# Patient Record
Sex: MALE | Race: WHITE | NOT HISPANIC OR LATINO
[De-identification: names, ages, dates, MRNs, and addresses within clinical notes are randomized per-mention and may not be internally consistent; named-entity substitution may affect disease eponyms.]

---

## 2019-12-04 ENCOUNTER — APPOINTMENT (OUTPATIENT)
Dept: DERMATOLOGY | Facility: CLINIC | Age: 64
End: 2019-12-04
Payer: COMMERCIAL

## 2019-12-04 DIAGNOSIS — L82.1 OTHER SEBORRHEIC KERATOSIS: ICD-10-CM

## 2019-12-04 DIAGNOSIS — L57.0 ACTINIC KERATOSIS: ICD-10-CM

## 2019-12-04 DIAGNOSIS — L81.4 OTHER MELANIN HYPERPIGMENTATION: ICD-10-CM

## 2019-12-04 PROBLEM — Z00.00 ENCOUNTER FOR PREVENTIVE HEALTH EXAMINATION: Status: ACTIVE | Noted: 2019-12-04

## 2019-12-04 PROCEDURE — 99203 OFFICE O/P NEW LOW 30 MIN: CPT | Mod: 25

## 2019-12-04 PROCEDURE — 17000 DESTRUCT PREMALG LESION: CPT

## 2019-12-04 RX ORDER — LEVOTHYROXINE SODIUM 0.2 MG/1
200 TABLET ORAL
Qty: 90 | Refills: 0 | Status: ACTIVE | COMMUNITY
Start: 2019-11-26

## 2019-12-04 RX ORDER — FLUOROURACIL 50 MG/G
5 CREAM TOPICAL
Qty: 1 | Refills: 1 | Status: ACTIVE | COMMUNITY
Start: 2019-12-04 | End: 1900-01-01

## 2019-12-04 NOTE — HISTORY OF PRESENT ILLNESS
[de-identified] : Pt. presents for skin check;\par No itching, bleeding, growing, changing lesions noted;\par Severity:  mild  \par Modifying factors:  none\par Associated symptoms:  none\par Context:  no association with activity \par One persistent lesion on right arm, for few mos; \par

## 2019-12-04 NOTE — PHYSICAL EXAM
[Full Body Skin Exam Performed] : performed [FreeTextEntry3] : Skin examination performed of the face, neck, trunk, arms, legs; \par The patient is well, alert and oriented, pleasant and cooperative.\par Eyelids, conjunctivae, oral mucosa, digits and nails all normal.  \par No cervical adenopathy.\par \par Normal findings include:\par \par Seborrheic keratoses\par Angiomas\par Lentigines\par scaling erythematous papule; hyperkeratotic;  R forearm\par scaling erythematous papules; across lower lip\par \par No lesions were suspicious for malignancy. \par \par

## 2019-12-04 NOTE — ASSESSMENT
[FreeTextEntry1] : Complete skin examination is negative for malignancy;\par The patient has a history of significant sun exposure.  Continue annual exams. \par \par LN2 to AK R arm\par 5FU to actinic cheilitis;  lower lip;  Risks and benefits of topical actinic keratosis treatments were discussed including redness, scaling, discomfort crusting, oozing, and recurrence.

## 2024-04-08 ENCOUNTER — EMERGENCY (EMERGENCY)
Facility: HOSPITAL | Age: 69
LOS: 0 days | Discharge: HOME | End: 2024-04-08
Attending: EMERGENCY MEDICINE
Payer: MEDICARE

## 2024-04-08 VITALS
HEART RATE: 74 BPM | OXYGEN SATURATION: 100 % | TEMPERATURE: 98 F | RESPIRATION RATE: 18 BRPM | DIASTOLIC BLOOD PRESSURE: 82 MMHG | SYSTOLIC BLOOD PRESSURE: 140 MMHG

## 2024-04-08 VITALS
SYSTOLIC BLOOD PRESSURE: 149 MMHG | WEIGHT: 214.95 LBS | HEART RATE: 77 BPM | TEMPERATURE: 97 F | RESPIRATION RATE: 18 BRPM | DIASTOLIC BLOOD PRESSURE: 93 MMHG | HEIGHT: 72 IN | OXYGEN SATURATION: 100 %

## 2024-04-08 DIAGNOSIS — M54.9 DORSALGIA, UNSPECIFIED: ICD-10-CM

## 2024-04-08 DIAGNOSIS — M54.16 RADICULOPATHY, LUMBAR REGION: ICD-10-CM

## 2024-04-08 DIAGNOSIS — E03.9 HYPOTHYROIDISM, UNSPECIFIED: ICD-10-CM

## 2024-04-08 PROCEDURE — 99284 EMERGENCY DEPT VISIT MOD MDM: CPT | Mod: 25

## 2024-04-08 PROCEDURE — 99284 EMERGENCY DEPT VISIT MOD MDM: CPT | Mod: FS

## 2024-04-08 PROCEDURE — 96375 TX/PRO/DX INJ NEW DRUG ADDON: CPT

## 2024-04-08 PROCEDURE — 96374 THER/PROPH/DIAG INJ IV PUSH: CPT

## 2024-04-08 RX ORDER — OXYCODONE HYDROCHLORIDE 5 MG/1
1 TABLET ORAL
Qty: 12 | Refills: 0
Start: 2024-04-08 | End: 2024-04-10

## 2024-04-08 RX ORDER — ACETAMINOPHEN 500 MG
1000 TABLET ORAL ONCE
Refills: 0 | Status: COMPLETED | OUTPATIENT
Start: 2024-04-08 | End: 2024-04-08

## 2024-04-08 RX ORDER — MORPHINE SULFATE 50 MG/1
4 CAPSULE, EXTENDED RELEASE ORAL ONCE
Refills: 0 | Status: DISCONTINUED | OUTPATIENT
Start: 2024-04-08 | End: 2024-04-08

## 2024-04-08 RX ORDER — KETOROLAC TROMETHAMINE 30 MG/ML
30 SYRINGE (ML) INJECTION ONCE
Refills: 0 | Status: DISCONTINUED | OUTPATIENT
Start: 2024-04-08 | End: 2024-04-08

## 2024-04-08 RX ADMIN — MORPHINE SULFATE 4 MILLIGRAM(S): 50 CAPSULE, EXTENDED RELEASE ORAL at 20:29

## 2024-04-08 RX ADMIN — Medication 30 MILLIGRAM(S): at 20:29

## 2024-04-08 RX ADMIN — Medication 400 MILLIGRAM(S): at 20:29

## 2024-04-08 NOTE — ED STATDOCS - CLINICAL SUMMARY MEDICAL DECISION MAKING FREE TEXT BOX
No red flags for back pain, including no fever, no hx of IVDU, no hx of CA, no trauma, no recent surgery to spine, no urinary retention, no bowel or bladder incontinence, no saddle anesthesia, no sensory or motor neurologic deficit.  Thus no concern for epidural abscess, epidural hematoma, cauda equina, vertebral fracture, or any other emergent cause for back pain.  Will give trial pain meds, reevaluate patient.

## 2024-04-08 NOTE — ED STATDOCS - OBJECTIVE STATEMENT
68-year-old male past medical history of essential tremor, BPH, autoimmune thyroiditis, hypothyroidism, who presented with chief complaint of back pain.  Patient stated he started having back pain a few days ago, with sciatica down the left leg.  States he has had back issues before.  Denies any weakness, incontinence, fever, or direct trauma to the back.  States he was evaluated by neurosurgery Dr. Edwards, and states he had an MRI today results still pending.  Patient was given steroid Dosepak, Flexeril, and has been taking Motrin as an outpatient without improvement symptoms.  Patient states that pain feels worse tonight, difficulty walking secondary to pain, can to ED for further evaluation.

## 2024-04-08 NOTE — ED STATDOCS - PATIENT PORTAL LINK FT
You can access the FollowMyHealth Patient Portal offered by [facility] by registering at the following website: [email or web address]. By joining [facility]’s FollowMyHealth portal, you will also be able to view your health information using other applications (apps) compatible with our system.

## 2024-04-08 NOTE — ED STATDOCS - CARE PLAN
Principal Discharge DX:	Back pain   1 Principal Discharge DX:	Left lumbar radiculopathy  Secondary Diagnosis:	Back pain

## 2024-04-08 NOTE — ED ADULT TRIAGE NOTE - IDEAL BODY WEIGHT(KG)

## 2024-04-08 NOTE — ED ADULT TRIAGE NOTE - CHIEF COMPLAINT QUOTE
Pt BIBEMS from home, c/o lower back pain radiating down the L leg. Had outpatient MRI performed today with no results at this time. Unrelieved by Tylenol, Flexeril, and Prednisone with no relief. No recent falls.

## 2024-04-08 NOTE — ED STATDOCS - PROGRESS NOTE DETAILS
68-year-old male with past medical history of hypothyroidism BPH essential tremor chronic back pain presents to ED complaining of throwing back out a few days ago with radiation of pain down the left leg denies traumatic injury fall saw his neurosurgeon Dr. Edwards was started on Flexeril Medrol Dosepak and anti-inflammatory without improvement patient went for outpatient MRI today and has follow-up in 2 days but was unable to tolerate pain and came to the ED for evaluation. Will maryam-enid s/p meds.    Victoria Price PA-C Pt Patient provided MRI results from 2 days imaging at [facility] MRI of lumbar spine shows a new left disc herniation at L2-3 that narrows the left neural foramen and just contacts the exiting left L2 nerve root.  There is mild central canal stenosis at L3-L4 also moderate to severe central canal moderate left stenosis at L4-5 with a superimposed central and left sided disc herniation resulting in mass effect on the D sending left L5 nerve root results reviewed with Dr. Edwards on reevaluate patient he does report some improvement in pain after medication given in ED patient able to stand and walk in the ER no bowel or bladder incontinence no saddle anesthesia patient has follow-up scheduled with Dr. Edwards on Wednesday will discharge home with oxycodone to help alleviate pain until appointment on Wednesday.  Patient will also continue meds as previously directed by Dr. Edwards.  Victoria Price PA-C

## 2024-04-08 NOTE — ED STATDOCS - NSFOLLOWUPINSTRUCTIONS_ED_ALL_ED_FT
Sciatica  Back view of a person showing a normal leg and a leg affected by the pain of sciatica.  Sciatica is pain, numbness, weakness, or tingling along the path of the sciatic nerve. The sciatic nerve starts in the lower back and runs down the back of each leg. The nerve controls the muscles in the lower leg and in the back of the knee. It also provides feeling (sensation) to the back of the thigh, the lower leg, and the sole of the foot. Sciatica is a symptom of another medical condition that pinches or puts pressure on the sciatic nerve.    Sciatica most often only affects one side of the body. Sciatica usually goes away on its own or with treatment. In some cases, sciatica may come back (recur).    What are the causes?  This condition is caused by pressure on the sciatic nerve or pinching of the nerve. This may be the result of:  A disk in between the bones of the spine bulging out too far (herniated disk).  Age-related changes in the spinal disks.  A pain disorder that affects a muscle in the buttock.  Extra bone growth near the sciatic nerve.  A break (fracture) of the pelvis.  Pregnancy.  Tumor. This is rare.  What increases the risk?  The following factors may make you more likely to develop this condition:  Playing sports that place pressure or stress on the spine.  Having poor strength and flexibility.  A history of back injury or surgery.  Sitting for long periods of time.  Doing activities that involve repetitive bending or lifting.  Obesity.  What are the signs or symptoms?  Symptoms can vary from mild to very severe. They may include:  Any of the following problems in the lower back, leg, hip, or buttock:  Mild tingling, numbness, or dull aches.  Burning sensations.  Sharp pains.  Numbness in the back of the calf or the sole of the foot.  Leg weakness.  Severe back pain that makes movement difficult.  Symptoms may get worse when you cough, sneeze, or laugh, or when you sit or stand for long periods of time.    How is this diagnosed?  This condition may be diagnosed based on:  Your symptoms and medical history.  A physical exam.  Blood tests.  Imaging tests, such as:  X-rays.  An MRI.  A CT scan.  How is this treated?  In many cases, this condition improves on its own without treatment. However, treatment may include:  Reducing or modifying physical activity.  Exercising, including strengthening and stretching.  Icing and applying heat to the affected area.  Medicines that help to:  Relieve pain and swelling.  Relax your muscles.  Injections of medicines that help to relieve pain and inflammation (steroids) around the sciatic nerve.  Surgery.  Follow these instructions at home:  Medicines    Take over-the-counter and prescription medicines only as told by your health care provider.  Ask your health care provider if the medicine prescribed to you requires you to avoid driving or using heavy machinery.  Managing pain    Bag of ice on a towel on the skin.  A heating pad for use on the affected area.  If directed, put ice on the affected area. To do this:  Put ice in a plastic bag.  Place a towel between your skin and the bag.  Leave the ice on for 20 minutes, 2–3 times a day.  If your skin turns bright red, remove the ice right away to prevent skin damage. The risk of skin damage is higher if you cannot feel pain, heat, or cold.  If directed, apply heat to the affected area as often as told by your health care provider. Use the heat source that your health care provider recommends, such as a moist heat pack or a heating pad.  Place a towel between your skin and the heat source.  Leave the heat on for 20–30 minutes.  If your skin turns bright red, remove the heat right away to prevent burns. The risk of burns is higher if you cannot feel pain, heat, or cold.  Activity    A comparison showing the right and wrong way to lift a heavy object.  Return to your normal activities as told by your health care provider. Ask your health care provider what activities are safe for you.  Avoid activities that make your symptoms worse.  Take brief periods of rest throughout the day.  When you rest for longer periods, mix in some mild activity or stretching between periods of rest. This will help to prevent stiffness and pain.  Avoid sitting for long periods of time without moving. Get up and move around at least one time each hour.  Exercise and stretch regularly as told by your health care provider.  Do not lift anything that is heavier than 10 lb (4.5 kg) until your health care provider says that it is safe. When you do not have symptoms, you should still avoid heavy lifting, especially repetitive heavy lifting.  When you lift objects, always use proper lifting technique, which includes:  Bending your knees.  Keeping the load close to your body.  Avoiding twisting.  General instructions    Maintain a healthy weight. Excess weight puts extra stress on your back.  Wear supportive, comfortable shoes. Avoid wearing high heels.  Avoid sleeping on a mattress that is too soft or too hard. A mattress that is firm enough to support your back when you sleep may help to reduce your pain.  Contact a health care provider if:  Your pain is not controlled by medicine.  Your pain does not improve or gets worse.  Your pain lasts longer than 4 weeks.  You have unexplained weight loss.  Get help right away if:  You are not able to control when you urinate or have bowel movements (incontinence).  You have:  Weakness in your lower back, pelvis, buttocks, or legs that gets worse.  Redness or swelling of your back.  A burning sensation when you urinate.  Summary  Sciatica is pain, numbness, weakness, or tingling along the path of the sciatic nerve, which may include the lower back, legs, hips, and buttocks.  This condition is caused by pressure on the sciatic nerve or pinching of the nerve.  Treatment often includes rest, exercise, medicines, and applying ice or heat.  This information is not intended to replace advice given to you by your health care provider. Make sure you discuss any questions you have with your health care provider.    Document Revised: 03/27/2023 Document Reviewed: 03/27/2023  [employer] Patient Education © 2024 [employer]  [employer] logo  Terms and Conditions  Privacy Policy  Editorial Policy  All content on this site: Copyright © 2024 [employer], its licensors, and contributors. All rights are reserved, including those for text and data mining, AI training, and similar technologies. For all open access content, the [employer] licensing terms apply.  Cookies are used by this site. To decline or learn more, visit our Cook

## 2024-04-09 ENCOUNTER — INPATIENT (INPATIENT)
Facility: HOSPITAL | Age: 69
LOS: 3 days | Discharge: HOME | DRG: 519 | End: 2024-04-13
Attending: INTERNAL MEDICINE | Admitting: INTERNAL MEDICINE
Payer: MEDICARE

## 2024-04-09 VITALS — WEIGHT: 220.02 LBS | HEIGHT: 72 IN

## 2024-04-09 DIAGNOSIS — E06.3 AUTOIMMUNE THYROIDITIS: ICD-10-CM

## 2024-04-09 DIAGNOSIS — M48.061 SPINAL STENOSIS, LUMBAR REGION WITHOUT NEUROGENIC CLAUDICATION: ICD-10-CM

## 2024-04-09 DIAGNOSIS — R33.8 OTHER RETENTION OF URINE: ICD-10-CM

## 2024-04-09 DIAGNOSIS — I10 ESSENTIAL (PRIMARY) HYPERTENSION: ICD-10-CM

## 2024-04-09 DIAGNOSIS — M51.A0: ICD-10-CM

## 2024-04-09 DIAGNOSIS — I45.2 BIFASCICULAR BLOCK: ICD-10-CM

## 2024-04-09 DIAGNOSIS — Z79.890 HORMONE REPLACEMENT THERAPY: ICD-10-CM

## 2024-04-09 DIAGNOSIS — M51.16 INTERVERTEBRAL DISC DISORDERS WITH RADICULOPATHY, LUMBAR REGION: ICD-10-CM

## 2024-04-09 DIAGNOSIS — N40.1 BENIGN PROSTATIC HYPERPLASIA WITH LOWER URINARY TRACT SYMPTOMS: ICD-10-CM

## 2024-04-09 DIAGNOSIS — E03.9 HYPOTHYROIDISM, UNSPECIFIED: ICD-10-CM

## 2024-04-09 LAB
ALBUMIN SERPL ELPH-MCNC: 3.7 G/DL — SIGNIFICANT CHANGE UP (ref 3.3–5)
ALP SERPL-CCNC: 90 U/L — SIGNIFICANT CHANGE UP (ref 40–120)
ALT FLD-CCNC: 11 U/L — LOW (ref 12–78)
ANION GAP SERPL CALC-SCNC: 2 MMOL/L — LOW (ref 5–17)
APTT BLD: 28 SEC — SIGNIFICANT CHANGE UP (ref 24.5–35.6)
AST SERPL-CCNC: 12 U/L — LOW (ref 15–37)
BASOPHILS # BLD AUTO: 0.02 K/UL — SIGNIFICANT CHANGE UP (ref 0–0.2)
BASOPHILS NFR BLD AUTO: 0.3 % — SIGNIFICANT CHANGE UP (ref 0–2)
BILIRUB SERPL-MCNC: 0.6 MG/DL — SIGNIFICANT CHANGE UP (ref 0.2–1.2)
BLD GP AB SCN SERPL QL: SIGNIFICANT CHANGE UP
BUN SERPL-MCNC: 20 MG/DL — SIGNIFICANT CHANGE UP (ref 7–23)
CALCIUM SERPL-MCNC: 9.2 MG/DL — SIGNIFICANT CHANGE UP (ref 8.5–10.1)
CHLORIDE SERPL-SCNC: 106 MMOL/L — SIGNIFICANT CHANGE UP (ref 96–108)
CO2 SERPL-SCNC: 32 MMOL/L — HIGH (ref 22–31)
CREAT SERPL-MCNC: 0.88 MG/DL — SIGNIFICANT CHANGE UP (ref 0.5–1.3)
EGFR: 94 ML/MIN/1.73M2 — SIGNIFICANT CHANGE UP
EOSINOPHIL # BLD AUTO: 0.04 K/UL — SIGNIFICANT CHANGE UP (ref 0–0.5)
EOSINOPHIL NFR BLD AUTO: 0.7 % — SIGNIFICANT CHANGE UP (ref 0–6)
GLUCOSE SERPL-MCNC: 118 MG/DL — HIGH (ref 70–99)
HCT VFR BLD CALC: 39.7 % — SIGNIFICANT CHANGE UP (ref 39–50)
HGB BLD-MCNC: 13.4 G/DL — SIGNIFICANT CHANGE UP (ref 13–17)
IMM GRANULOCYTES NFR BLD AUTO: 0.2 % — SIGNIFICANT CHANGE UP (ref 0–0.9)
INR BLD: 0.93 RATIO — SIGNIFICANT CHANGE UP (ref 0.85–1.18)
LYMPHOCYTES # BLD AUTO: 1.15 K/UL — SIGNIFICANT CHANGE UP (ref 1–3.3)
LYMPHOCYTES # BLD AUTO: 19.2 % — SIGNIFICANT CHANGE UP (ref 13–44)
MCHC RBC-ENTMCNC: 29.7 PG — SIGNIFICANT CHANGE UP (ref 27–34)
MCHC RBC-ENTMCNC: 33.8 GM/DL — SIGNIFICANT CHANGE UP (ref 32–36)
MCV RBC AUTO: 88 FL — SIGNIFICANT CHANGE UP (ref 80–100)
MONOCYTES # BLD AUTO: 0.41 K/UL — SIGNIFICANT CHANGE UP (ref 0–0.9)
MONOCYTES NFR BLD AUTO: 6.9 % — SIGNIFICANT CHANGE UP (ref 2–14)
NEUTROPHILS # BLD AUTO: 4.35 K/UL — SIGNIFICANT CHANGE UP (ref 1.8–7.4)
NEUTROPHILS NFR BLD AUTO: 72.7 % — SIGNIFICANT CHANGE UP (ref 43–77)
PLATELET # BLD AUTO: 195 K/UL — SIGNIFICANT CHANGE UP (ref 150–400)
POTASSIUM SERPL-MCNC: 4.4 MMOL/L — SIGNIFICANT CHANGE UP (ref 3.5–5.3)
POTASSIUM SERPL-SCNC: 4.4 MMOL/L — SIGNIFICANT CHANGE UP (ref 3.5–5.3)
PROT SERPL-MCNC: 7.3 GM/DL — SIGNIFICANT CHANGE UP (ref 6–8.3)
PROTHROM AB SERPL-ACNC: 10.5 SEC — SIGNIFICANT CHANGE UP (ref 9.5–13)
RBC # BLD: 4.51 M/UL — SIGNIFICANT CHANGE UP (ref 4.2–5.8)
RBC # FLD: 12.5 % — SIGNIFICANT CHANGE UP (ref 10.3–14.5)
SODIUM SERPL-SCNC: 140 MMOL/L — SIGNIFICANT CHANGE UP (ref 135–145)
WBC # BLD: 5.98 K/UL — SIGNIFICANT CHANGE UP (ref 3.8–10.5)
WBC # FLD AUTO: 5.98 K/UL — SIGNIFICANT CHANGE UP (ref 3.8–10.5)

## 2024-04-09 PROCEDURE — 73552 X-RAY EXAM OF FEMUR 2/>: CPT | Mod: 26,LT

## 2024-04-09 PROCEDURE — 99285 EMERGENCY DEPT VISIT HI MDM: CPT

## 2024-04-09 PROCEDURE — 73502 X-RAY EXAM HIP UNI 2-3 VIEWS: CPT | Mod: 26,LT

## 2024-04-09 RX ORDER — LIDOCAINE 4 G/100G
1 CREAM TOPICAL ONCE
Refills: 0 | Status: COMPLETED | OUTPATIENT
Start: 2024-04-09 | End: 2024-04-09

## 2024-04-09 RX ORDER — HYDROMORPHONE HYDROCHLORIDE 2 MG/ML
0.5 INJECTION INTRAMUSCULAR; INTRAVENOUS; SUBCUTANEOUS ONCE
Refills: 0 | Status: DISCONTINUED | OUTPATIENT
Start: 2024-04-09 | End: 2024-04-09

## 2024-04-09 RX ORDER — SODIUM CHLORIDE 9 MG/ML
3 INJECTION INTRAMUSCULAR; INTRAVENOUS; SUBCUTANEOUS ONCE
Refills: 0 | Status: COMPLETED | OUTPATIENT
Start: 2024-04-09 | End: 2024-04-09

## 2024-04-09 RX ORDER — KETOROLAC TROMETHAMINE 30 MG/ML
30 SYRINGE (ML) INJECTION ONCE
Refills: 0 | Status: DISCONTINUED | OUTPATIENT
Start: 2024-04-09 | End: 2024-04-09

## 2024-04-09 RX ADMIN — HYDROMORPHONE HYDROCHLORIDE 0.5 MILLIGRAM(S): 2 INJECTION INTRAMUSCULAR; INTRAVENOUS; SUBCUTANEOUS at 20:53

## 2024-04-09 RX ADMIN — SODIUM CHLORIDE 3 MILLILITER(S): 9 INJECTION INTRAMUSCULAR; INTRAVENOUS; SUBCUTANEOUS at 20:46

## 2024-04-09 RX ADMIN — LIDOCAINE 1 PATCH: 4 CREAM TOPICAL at 20:53

## 2024-04-09 RX ADMIN — Medication 30 MILLIGRAM(S): at 20:53

## 2024-04-09 NOTE — ED PROVIDER NOTE - CARE PLAN
Principal Discharge DX:	Intractable low back pain  Secondary Diagnosis:	Low back pain with left-sided sciatica   1

## 2024-04-09 NOTE — ED ADULT NURSE NOTE - OBJECTIVE STATEMENT
67 y/o A&O x 4 male presents to the ED c/o of lower back pain. Pt states that he was lifting heavy objects on Friday and ruptured his L4 disc. Pt has been taking oxycodone and tylenol, but states that it didn't resolve his pain, the oxycodone only helped him fall asleep. Pt is scheduled for surgery on Monday 4/15 but as per his wife he "couldn't get to the preliminary appointments because he can't walk". 20G PIV in LAC placed. Pt comfortable and safety maintained at this time.

## 2024-04-09 NOTE — ED PROVIDER NOTE - PHYSICAL EXAMINATION
Gen: white male, awake, alert, no respiratory distress  Head: NC/AT  Eyes: PERRL, EOMI  ENT: oropharynx clear, mucous membranes mildly dry  Card: regular rate and rhythm, normal radial pulse  Resp: Breath sounds clear bilaterally, respirations normal  Abd: soft, non-tender hypoactive bowel sounds with normal pitch, no rebound, guarding or mass   Msk: MAEx4 without focal deformities, tenderness or swelling, neck non-tender, supple without pain, no midline spinal ttp, no para-lumbar muscle ttp nor swelling, mild left sciatic notch tenderness , + left lateral hip ttp with good AROM with pain, left SLR 30 degrees with pain, no motor weakness, lle distal motor sensory intact, 2 + dp pulse, rle distal motor sensory intact, 2 + dp pulse, right SLR limited due to LBP  Skin: no tactile warmth, no rash,   Neuro: Alert and oriented, no focal deficits, no motor or sensory deficits Gen: white male, awake, alert, no respiratory distress  Head: NC/AT  Eyes: PERRL, EOMI  ENT: oropharynx clear, mucous membranes mildly dry  Card: regular rate and rhythm, normal radial pulse  Resp: Breath sounds clear bilaterally, respirations normal  Abd: soft, non-tender hypoactive bowel sounds with normal pitch, no rebound, guarding or mass   Msk: MAEx4 without focal deformities, tenderness nor swelling. Neck nontender, supple without pain. Back: no midline/spinal TTP, no para-lumbar muscle TTP nor swelling, +mild left sciatic notch tenderness , + left lateral hip TTP with good AROM with pain, left SLR 30 degrees with pain, no motor weakness, LLE distal motor/sensory intact, 2 + DP pulse; RLE distal motor/sensory intact, 2 + DP pulse, right SLR limited due to LBP  Skin: no tactile warmth, no rash,   Neuro: Alert and oriented, no focal deficits, no motor or sensory deficits, normal speech, CNs intact

## 2024-04-09 NOTE — ED ADULT TRIAGE NOTE - CHIEF COMPLAINT QUOTE
Pt presents to the ED c/o low back pain. Pt ruptured his L4 disc on Friday lifting heavy objects. Pt seen in ED last night and was given morphine. Pt has been taking oxycodone without relief today. Pt scheduled to have surgery with Dr. Ramos on 4/15. Pt unable to undergo presurgical testing due to pain.

## 2024-04-09 NOTE — ED PROVIDER NOTE - OBJECTIVE STATEMENT
67 y/o male with PMHx of L4 disc rupture after heavy lifting dx on MRI performed yesterday, seen in  ED yesterday treated with Morphine and discharged on oxycodone presents to the ED with intractable back pain, radiates down to foot., Pt also on medrol dose pack and flexeril for pain as well however states he still has severe pain. No fevers, chills, bowel/bladder incontinence, saddle anesthesias. Pt saw Dr. Edwards today and was prescribed more oxycodone. Pt last dose oxycodone was just PTA, stopped taking flexeril    eloise Edwards 67 y/o male with PMHx of L4 disc rupture after heavy lifting dx on MRI performed yesterday, seen in  ED yesterday treated with Morphine and discharged on oxycodone presents to the ED with intractable back pain, radiates down to foot., Pt also on medrol dose pack and flexeril for pain as well however states he still has severe pain. No fevers, chills, bowel/bladder incontinence, saddle anesthesias. Pt saw Dr. Edwards today and was prescribed more oxycodone. Pt last dose oxycodone was just PTA, stopped taking flexeril    Neurosurgery/Spin:  Grace     PCP: Parag Dahl      Cardiology at [facility] in [place], pt & wife forgot name. 67 y/o male with PMHx of LBP s/p heavy lifting: L4 disc rupture dx on outpt MRI performed yesterday, seen in  ED yesterday treated with Morphine w/ sympt./clinical relief, +self-ambulatory & discharged on oxycodone, BIB pvt car to the ED with intractable back pain, radiates down to L foot., Pt also on Medrol dose pack and Flexeril for pain as well however states he still has severe pain. No fevers, chills, bowel/bladder incontinence, saddle anesthesias. Pt saw Neurosurgeon/Spine specialist Dr. Edwards today and was prescribed more oxycodone, scheduled next week for spine sx. Pt last dose oxycodone was just PTA, stopped taking Flexeril: "It doesn't work."  Neurosurgery/Spin:  Grace     PCP: Parag Dahl      Cardiology at [facility] in [place], pt & wife forgot name.

## 2024-04-09 NOTE — ED ADULT NURSE REASSESSMENT NOTE - NS ED NURSE REASSESS COMMENT FT1
Received patient from TIFFANIE Romero. patient A&Ox4, VSS. Patient complaining of back pain. denies numbness/tingliness, able to move extremities.  No signs of distress noted. All questions answered. Safety measures in place.

## 2024-04-09 NOTE — ED ADULT NURSE REASSESSMENT NOTE - NS ED NURSE REASSESS COMMENT FT1
TAMIR Cam attempted ambulation trail with patient. Patient able to stand but unable to take a step forward due to pain. Dr. Nair made aware.

## 2024-04-09 NOTE — ED ADULT NURSE NOTE - NSFALLHARMRISKINTERV_ED_ALL_ED
Assistance OOB with selected safe patient handling equipment if applicable/Assistance with ambulation/Communicate risk of Fall with Harm to all staff, patient, and family/Monitor gait and stability/Provide visual cue: red socks, yellow wristband, yellow gown, etc/Reinforce activity limits and safety measures with patient and family/Bed in lowest position, wheels locked, appropriate side rails in place/Call bell, personal items and telephone in reach/Instruct patient to call for assistance before getting out of bed/chair/stretcher/Non-slip footwear applied when patient is off stretcher/[place] to call system/Physically safe environment - no spills, clutter or unnecessary equipment/Purposeful Proactive Rounding/Room/bathroom lighting operational, light cord in reach

## 2024-04-09 NOTE — ED PROVIDER NOTE - CLINICAL SUMMARY MEDICAL DECISION MAKING FREE TEXT BOX
69 y/o male with L4 disc rupture shown on MRI yesterday s/p heavy lifting 1-2 weeks ago presents to the ED with intractable back pain. impairing ability to ambulate, pt scheduled next week for surgery by Dr. Edwards neurosurgery but cannot tolerate the pain nor ambulate. no b/b changes, no falls, + left sciatica notch and left hip ttp, decreased SLR due to pain, basic labs, XR pelvis hip femur  IV Toradol, Dilaudid, Lidocaine patch, fall precautions, monitor observe, reassess, spine consult 69 y/o male with L4 disc rupture shown on MRI yesterday s/p heavy lifting 1-2 weeks ago presents to the ED with intractable back pain. impairing ability to ambulate, pt scheduled next week for surgery by Dr. Edwards neurosurgery but cannot tolerate the pain nor ambulate. no b/b changes, no falls, + left sciatica notch and left hip ttp, decreased SLR due to pain,   Plan: basic labs, XR pelvis hip femur  IV Toradol, Dilaudid, Lidocaine patch, fall precautions, monitor observe, reassess. 69 y/o male with L4 disc rupture shown on MRI yesterday s/p heavy lifting 1-2 weeks ago presents to the ED with intractable back pain. impairing ability to ambulate, pt scheduled next week for surgery by Dr. Edwards neurosurgery but cannot tolerate the pain nor ambulate. no b/b changes, no falls, + left sciatica notch and left hip ttp, decreased SLR due to pain,   Plan: basic labs, XR pelvis hip femur  IV Toradol, Dilaudid, Lidocaine patch, fall precautions, monitor observe, reassess.    00:00, NOEL Nair MD:  Labs unremarkable.  XRs wet read by me: negative.  Pt reported some partial sympt. improvement s/p meds but + pain flare-up upon standing up & attempting ambulation trial, which he failed d/t too much LBP.  Case d/w RAVEN PA for Spine consult: will consult but no acute RAVEN intervention indicated, advised Med admit for pain management.  Case d/w Dr. Molina, who accepted Med admit. 69 y/o male with L4 disc rupture shown on MRI yesterday s/p heavy lifting 1-2 weeks ago, BIB pvt car to the ED with intractable back pain impairing ability to ambulate, pt scheduled next week for surgery by Dr. Edwards Neurosurgery but cannot tolerate the pain nor ambulate. no b/b changes, no falls, + left sciatica notch and left hip TTP, decreased SLR due to pain,   Plan: basic labs, XR pelvis hip femur  IV Toradol, Dilaudid, Lidocaine patch, fall precautions, monitor observe, reassess.    00:00, NOEL Nair MD:  Labs unremarkable.  XRs wet read by me: negative.  Pt reported some partial sympt. improvement s/p meds but + pain flare-up upon standing up & attempting ambulation trial, which he failed d/t too much LBP.  Case d/w RAVEN PA for Spine consult: will consult but no acute RAVEN intervention indicated, advised Med admit for pain management.  Case d/w Dr. Molina, who accepted Med admit.

## 2024-04-09 NOTE — ED PROVIDER NOTE - CARE PROVIDER_API CALL
Ankit Edwards.  Neurosurgery  [address], [address]  [place] [address]  Phone: [phone]  Fax: [phone]  Follow Up Time:

## 2024-04-09 NOTE — ED PROVIDER NOTE - MEDICATIONS Q1 - PARENTERAL NARCOTICS ADMINISTERED FOR MANAGEMENT OF PAIN
I concur with the Admission Order and I certify that services are provided in accordance with Section 42 CFR § 412.3
Yes

## 2024-04-10 DIAGNOSIS — M54.59 OTHER LOW BACK PAIN: ICD-10-CM

## 2024-04-10 LAB
BUN SERPL-MCNC: 26 MG/DL — HIGH (ref 7–23)
CALCIUM SERPL-MCNC: 8.9 MG/DL — SIGNIFICANT CHANGE UP (ref 8.5–10.1)
CHLORIDE SERPL-SCNC: 105 MMOL/L — SIGNIFICANT CHANGE UP (ref 96–108)
CO2 SERPL-SCNC: 34 MMOL/L — HIGH (ref 22–31)
CREAT SERPL-MCNC: 1.04 MG/DL — SIGNIFICANT CHANGE UP (ref 0.5–1.3)
CRP SERPL-MCNC: <3 MG/L — SIGNIFICANT CHANGE UP
EGFR: 79 ML/MIN/1.73M2 — SIGNIFICANT CHANGE UP
ERYTHROCYTE [SEDIMENTATION RATE] IN BLOOD: 8 MM/HR — SIGNIFICANT CHANGE UP (ref 0–20)
GLUCOSE SERPL-MCNC: 102 MG/DL — HIGH (ref 70–99)
MAGNESIUM SERPL-MCNC: 2.4 MG/DL — SIGNIFICANT CHANGE UP (ref 1.6–2.6)
PHOSPHATE SERPL-MCNC: 4.5 MG/DL — SIGNIFICANT CHANGE UP (ref 2.5–4.5)
POTASSIUM SERPL-MCNC: 3.9 MMOL/L — SIGNIFICANT CHANGE UP (ref 3.5–5.3)
POTASSIUM SERPL-SCNC: 3.9 MMOL/L — SIGNIFICANT CHANGE UP (ref 3.5–5.3)
SODIUM SERPL-SCNC: 139 MMOL/L — SIGNIFICANT CHANGE UP (ref 135–145)

## 2024-04-10 PROCEDURE — 86900 BLOOD TYPING SEROLOGIC ABO: CPT

## 2024-04-10 PROCEDURE — 85025 COMPLETE CBC W/AUTO DIFF WBC: CPT

## 2024-04-10 PROCEDURE — 97162 PT EVAL MOD COMPLEX 30 MIN: CPT | Mod: GP

## 2024-04-10 PROCEDURE — 83735 ASSAY OF MAGNESIUM: CPT

## 2024-04-10 PROCEDURE — 36415 COLL VENOUS BLD VENIPUNCTURE: CPT

## 2024-04-10 PROCEDURE — 85652 RBC SED RATE AUTOMATED: CPT

## 2024-04-10 PROCEDURE — 99223 1ST HOSP IP/OBS HIGH 75: CPT | Mod: AI

## 2024-04-10 PROCEDURE — 86803 HEPATITIS C AB TEST: CPT

## 2024-04-10 PROCEDURE — 72158 MRI LUMBAR SPINE W/O & W/DYE: CPT | Mod: 26

## 2024-04-10 PROCEDURE — 84100 ASSAY OF PHOSPHORUS: CPT

## 2024-04-10 PROCEDURE — 72158 MRI LUMBAR SPINE W/O & W/DYE: CPT | Mod: MC

## 2024-04-10 PROCEDURE — 86850 RBC ANTIBODY SCREEN: CPT

## 2024-04-10 PROCEDURE — 99232 SBSQ HOSP IP/OBS MODERATE 35: CPT

## 2024-04-10 PROCEDURE — 93010 ELECTROCARDIOGRAM REPORT: CPT

## 2024-04-10 PROCEDURE — 82962 GLUCOSE BLOOD TEST: CPT

## 2024-04-10 PROCEDURE — 86140 C-REACTIVE PROTEIN: CPT

## 2024-04-10 PROCEDURE — 76000 FLUOROSCOPY <1 HR PHYS/QHP: CPT

## 2024-04-10 PROCEDURE — 86901 BLOOD TYPING SEROLOGIC RH(D): CPT

## 2024-04-10 PROCEDURE — 93005 ELECTROCARDIOGRAM TRACING: CPT

## 2024-04-10 PROCEDURE — C1889: CPT

## 2024-04-10 PROCEDURE — 93970 EXTREMITY STUDY: CPT

## 2024-04-10 PROCEDURE — A9579: CPT

## 2024-04-10 PROCEDURE — 85027 COMPLETE CBC AUTOMATED: CPT

## 2024-04-10 PROCEDURE — 80048 BASIC METABOLIC PNL TOTAL CA: CPT

## 2024-04-10 PROCEDURE — 71045 X-RAY EXAM CHEST 1 VIEW: CPT

## 2024-04-10 PROCEDURE — 71045 X-RAY EXAM CHEST 1 VIEW: CPT | Mod: 26

## 2024-04-10 RX ORDER — MORPHINE SULFATE 50 MG/1
2 CAPSULE, EXTENDED RELEASE ORAL ONCE
Refills: 0 | Status: DISCONTINUED | OUTPATIENT
Start: 2024-04-10 | End: 2024-04-10

## 2024-04-10 RX ORDER — HYDROMORPHONE HYDROCHLORIDE 2 MG/ML
1 INJECTION INTRAMUSCULAR; INTRAVENOUS; SUBCUTANEOUS EVERY 4 HOURS
Refills: 0 | Status: DISCONTINUED | OUTPATIENT
Start: 2024-04-10 | End: 2024-04-13

## 2024-04-10 RX ORDER — ONDANSETRON 8 MG/1
4 TABLET, FILM COATED ORAL ONCE
Refills: 0 | Status: DISCONTINUED | OUTPATIENT
Start: 2024-04-10 | End: 2024-04-13

## 2024-04-10 RX ORDER — LANOLIN ALCOHOL/MO/W.PET/CERES
5 CREAM (GRAM) TOPICAL AT BEDTIME
Refills: 0 | Status: DISCONTINUED | OUTPATIENT
Start: 2024-04-10 | End: 2024-04-10

## 2024-04-10 RX ORDER — SODIUM CHLORIDE 9 MG/ML
500 INJECTION INTRAMUSCULAR; INTRAVENOUS; SUBCUTANEOUS
Refills: 0 | Status: DISCONTINUED | OUTPATIENT
Start: 2024-04-10 | End: 2024-04-10

## 2024-04-10 RX ORDER — CYCLOBENZAPRINE HYDROCHLORIDE 10 MG/1
10 TABLET, FILM COATED ORAL ONCE
Refills: 0 | Status: COMPLETED | OUTPATIENT
Start: 2024-04-10 | End: 2024-04-10

## 2024-04-10 RX ORDER — LEVOTHYROXINE SODIUM 125 MCG
1 TABLET ORAL
Refills: 0 | DISCHARGE

## 2024-04-10 RX ORDER — OXYCODONE HYDROCHLORIDE 5 MG/1
10 TABLET ORAL EVERY 4 HOURS
Refills: 0 | Status: DISCONTINUED | OUTPATIENT
Start: 2024-04-10 | End: 2024-04-13

## 2024-04-10 RX ORDER — LANOLIN ALCOHOL/MO/W.PET/CERES
5 CREAM (GRAM) TOPICAL AT BEDTIME
Refills: 0 | Status: DISCONTINUED | OUTPATIENT
Start: 2024-04-10 | End: 2024-04-13

## 2024-04-10 RX ORDER — KETOROLAC TROMETHAMINE 30 MG/ML
15 SYRINGE (ML) INJECTION ONCE
Refills: 0 | Status: DISCONTINUED | OUTPATIENT
Start: 2024-04-10 | End: 2024-04-10

## 2024-04-10 RX ORDER — OXYCODONE HYDROCHLORIDE 5 MG/1
5 TABLET ORAL EVERY 4 HOURS
Refills: 0 | Status: DISCONTINUED | OUTPATIENT
Start: 2024-04-10 | End: 2024-04-13

## 2024-04-10 RX ORDER — LEVOTHYROXINE SODIUM 125 MCG
200 TABLET ORAL DAILY
Refills: 0 | Status: DISCONTINUED | OUTPATIENT
Start: 2024-04-10 | End: 2024-04-13

## 2024-04-10 RX ORDER — LOSARTAN POTASSIUM 100 MG/1
50 TABLET, FILM COATED ORAL DAILY
Refills: 0 | Status: DISCONTINUED | OUTPATIENT
Start: 2024-04-10 | End: 2024-04-13

## 2024-04-10 RX ORDER — CYCLOBENZAPRINE HYDROCHLORIDE 10 MG/1
5 TABLET, FILM COATED ORAL THREE TIMES A DAY
Refills: 0 | Status: DISCONTINUED | OUTPATIENT
Start: 2024-04-10 | End: 2024-04-13

## 2024-04-10 RX ORDER — ACETAMINOPHEN 500 MG
650 TABLET ORAL EVERY 6 HOURS
Refills: 0 | Status: DISCONTINUED | OUTPATIENT
Start: 2024-04-10 | End: 2024-04-13

## 2024-04-10 RX ORDER — ENOXAPARIN SODIUM 100 MG/ML
40 INJECTION SUBCUTANEOUS EVERY 24 HOURS
Refills: 0 | Status: DISCONTINUED | OUTPATIENT
Start: 2024-04-10 | End: 2024-04-10

## 2024-04-10 RX ADMIN — MORPHINE SULFATE 2 MILLIGRAM(S): 50 CAPSULE, EXTENDED RELEASE ORAL at 02:42

## 2024-04-10 RX ADMIN — LIDOCAINE 1 PATCH: 4 CREAM TOPICAL at 06:48

## 2024-04-10 RX ADMIN — Medication 200 MICROGRAM(S): at 11:34

## 2024-04-10 RX ADMIN — Medication 15 MILLIGRAM(S): at 04:46

## 2024-04-10 RX ADMIN — ENOXAPARIN SODIUM 40 MILLIGRAM(S): 100 INJECTION SUBCUTANEOUS at 11:34

## 2024-04-10 RX ADMIN — Medication 15 MILLIGRAM(S): at 05:01

## 2024-04-10 RX ADMIN — LOSARTAN POTASSIUM 50 MILLIGRAM(S): 100 TABLET, FILM COATED ORAL at 11:34

## 2024-04-10 RX ADMIN — SODIUM CHLORIDE 125 MILLILITER(S): 9 INJECTION INTRAMUSCULAR; INTRAVENOUS; SUBCUTANEOUS at 02:43

## 2024-04-10 RX ADMIN — OXYCODONE HYDROCHLORIDE 10 MILLIGRAM(S): 5 TABLET ORAL at 11:33

## 2024-04-10 RX ADMIN — HYDROMORPHONE HYDROCHLORIDE 1 MILLIGRAM(S): 2 INJECTION INTRAMUSCULAR; INTRAVENOUS; SUBCUTANEOUS at 19:08

## 2024-04-10 RX ADMIN — OXYCODONE HYDROCHLORIDE 10 MILLIGRAM(S): 5 TABLET ORAL at 15:55

## 2024-04-10 NOTE — H&P ADULT - HISTORY OF PRESENT ILLNESS
68 M presents to the ED with acute lower back pain with radiation to the left leg which started on Friday after he was doing some heavy lifting around the house.  He denies any fall or trauma.  Reports that the pain was severe and radiating to the left leg, foot and the left hip and groin.  He was seen by Grace Haywood and had an outpatient MRI on 4/8 of the lower back which reportedly showed moderate-severe canal stenosis, moderate left foraminal stenosis at L4-L5 and left sided acute disk herniation with mass effect on the L5 nerve root.  He was scheduled for surgery with Dr. Edwards on Monday.  He was seen in the ED 1 day prior and was treated with Morphine and sent home with oxycodone.  He came back to the ED with intractable back pain and was admitted to medicine.  He was seen by Dr. Fuller and the NS PA and outpatient MRI findings were reviewed.  He is planned to get an MRI with contrast today and may have surgery with Dr. Fuller on this admission.  He denies any fevers, chills, shakes, nausea, vomiting.    PAST MEDICAL HISTORY:  Hashimotos Thyroiditis with Hypothyroidism  HTN    PAST SURGICAL HISTORY:  s/p cervical neck surgery    FAMILY HISTORY:   Father -  alive, 93 years old  Mother - lung cancer

## 2024-04-10 NOTE — PATIENT PROFILE ADULT - FALL HARM RISK - HARM RISK INTERVENTIONS
Assistance with ambulation/Assistance OOB with selected safe patient handling equipment/Communicate Risk of Fall with Harm to all staff/Discuss with provider need for PT consult/Monitor gait and stability/Reinforce activity limits and safety measures with patient and family/Tailored Fall Risk Interventions/Visual Cue: Yellow wristband and red socks/Bed in lowest position, wheels locked, appropriate side rails in place/Call bell, personal items and telephone in reach/Instruct patient to call for assistance before getting out of bed or chair/Non-slip footwear when patient is out of bed/[place] to call system/Physically safe environment - no spills, clutter or unnecessary equipment/Purposeful Proactive Rounding/Room/bathroom lighting operational, light cord in reach

## 2024-04-10 NOTE — H&P ADULT - ASSESSMENT
68 M with HX of Hashimotos Thyroiditis, HTN, s/p cervical neck surgery, was admitted with acute intractable low back pain and left leg radiculopathy.    Acute Intractable Low Back Pain and Acute Left sided Radiculopathy with L4 Disk Herniation  -  appreciate NS consult  -  pain control  -  Flexeril PRN  -  repeat MRI of the LS spine with IV contrast  -  may need surgery on this admission  -  neurochecks q4 hours  -  follow up xrays of the left hip  -  ESR normal, CRP pending    HTN  -  continue Olmesartan    Hypothyroidism  -  continue Synthroid    DVT prophylaxis  -  venodynes and Lovenox    d/w patient and RN and NS PA   68 M with HX of Hashimotos Thyroiditis, HTN, s/p cervical neck surgery, was admitted with acute intractable low back pain and left leg radiculopathy.    Acute Intractable Low Back Pain and Acute Left sided Radiculopathy with L4 Disk Herniation  -  appreciate NS consult  -  pain control  -  Flexeril PRN  -  repeat MRI of the LS spine with IV contrast  -  may need surgery on this admission  -  neurochecks q4 hours  -  follow up xrays of the left hip  -  ESR normal, CRP pending    HTN  -  continue Olmesartan    Hypothyroidism  -  continue Synthroid    Pre-Op Medical Evaluation  -  patient with good functional status, METS >4  -  EKG:  NSR, RBBB, LAD, LAHB, no acute ischemic changes  -  will have his cardiologist fax over outpatient ECHO and stress test results - spoke with Dr. Holman    DVT prophylaxis  -  venodynes and Lovenox    d/w patient and RN and TAD HENRY   68 M with HX of Hashimotos Thyroiditis, HTN, s/p cervical neck surgery, was admitted with acute intractable low back pain and left leg radiculopathy.    Acute Intractable Low Back Pain and Acute Left sided Radiculopathy with L4 Disk Herniation  -  appreciate NS consult  -  pain control  -  Flexeril PRN  -  repeat MRI of the LS spine with IV contrast  -  may need surgery on this admission  -  neurochecks q4 hours  -  follow up xrays of the left hip  -  ESR normal, CRP pending    HTN  -  continue Olmesartan    Hypothyroidism  -  continue Synthroid    Pre-Op Medical Evaluation  -  patient with good functional status, METS >4  -  RCRI:  0  -  EKG:  NSR, RBBB, LAD, LAHB, no acute ischemic changes  -  CXR:  no acute infiltrates or effusions  -  ECHO and Stress Test from office in 2023 placed in the chart - ECHO EF60-65%, no pulm HTN,  no AS, no MS, trace MR.  Exercise Stress test - negative  -  patient without any prior Hx of CHF, Arrthymias, Valvular Heart Disease, CAD, COPD/Asthma, WALTER, DVT/PE, DM, CKD, TIA/CVA  -  patient requires lumbar spine diskectomy, and at this time he is medically optimized and there are no medical contraindications to proceed with surgery tomorrow and no further cardiac testing is needed    DVT prophylaxis  -  venodynes and Lovenox    d/w patient and RN and TAD HENRY

## 2024-04-10 NOTE — H&P ADULT - NSHPLABSRESULTS_GEN_ALL_CORE
13.4   5.98  )-----------( 195      ( 09 Apr 2024 20:41 )             39.7     04-09    140  |  106  |  20  ----------------------------<  118<H>  4.4   |  32<H>  |  0.88    Ca    9.2      09 Apr 2024 20:41    TPro  7.3  /  Alb  3.7  /  TBili  0.6  /  DBili  x   /  AST  12<L>  /  ALT  11<L>  /  AlkPhos  90  04-09    LIVER FUNCTIONS - ( 09 Apr 2024 20:41 )  Alb: 3.7 g/dL / Pro: 7.3 gm/dL / ALK PHOS: 90 U/L / ALT: 11 U/L / AST: 12 U/L / GGT: x           PT/INR - ( 09 Apr 2024 20:41 )   PT: 10.5 sec;   INR: 0.93 ratio       PTT - ( 09 Apr 2024 20:41 )  PTT:28.0 sec  Urinalysis Basic - ( 09 Apr 2024 20:41 )    ESR - 8    Color: x / Appearance: x / SG: x / pH: x  Gluc: 118 mg/dL / Ketone: x  / Bili: x / Urobili: x   Blood: x / Protein: x / Nitrite: x   Leuk Esterase: x / RBC: x / WBC x   Sq Epi: x / Non Sq Epi: x / Bacteria: x    EKG:  per my read, NSR, no acute ischemic changes, RBBB, LAHB, LAD    CXR:  Per my read, no acute infiltrates or effusions, awaiting final read    Xray of the left hip and pelvis:  per my read, no acute fractures or dislocation, awaiting final read

## 2024-04-10 NOTE — PROVIDER CONTACT NOTE (OTHER) - ACTION/TREATMENT ORDERED:
Called admitting hospitalist. MD stated she will put in an order for dopplers as well as come assess the patient. Will continue to monitor.

## 2024-04-10 NOTE — H&P ADULT - NSHPPHYSICALEXAM_GEN_ALL_CORE
HEENT:  pupils equal and reactive, EOMI, no oropharyngeal lesions, erythema, exudates, oral thrush  NECK:   supple, no carotid bruits  CV:  +S1, +S2, regular, no murmurs  RESP:   lungs clear to auscultation bilaterally, no wheezing, rales, rhonchi, good air entry bilaterally  GI:  abdomen soft, non-tender, non-distended, normal BS  EXT:  no clubbing, no cyanosis, no edema, no calf pain, swelling or erythema  NEURO:  AAOX3, no focal neurological deficits, follows all commands, able to move extremities spontaneously, + left hip pain with straight leg raising, reflexes equal and symmetric bilaterally   SKIN:  no ulcers, lesions or rashes

## 2024-04-10 NOTE — H&P ADULT - TIME BILLING
I spent a total of 75 minutes on the date of this encounter coordinating the patient's care. This includes reviewing prior documentation, results and imaging in addition to completing a full history and physical examination on the patient. Further tests, medications, and procedures have been ordered as indicated. Laboratory results and the plan of care were communicated to the patient and/or their family member. Supporting documentation was completed and added to the patient's chart.

## 2024-04-11 LAB
HCV AB S/CO SERPL IA: 0.1 S/CO — SIGNIFICANT CHANGE UP (ref 0–0.99)
HCV AB SERPL-IMP: SIGNIFICANT CHANGE UP

## 2024-04-11 PROCEDURE — 93970 EXTREMITY STUDY: CPT | Mod: 26

## 2024-04-11 PROCEDURE — 99222 1ST HOSP IP/OBS MODERATE 55: CPT | Mod: 57

## 2024-04-11 PROCEDURE — 99233 SBSQ HOSP IP/OBS HIGH 50: CPT

## 2024-04-11 RX ORDER — TAMSULOSIN HYDROCHLORIDE 0.4 MG/1
0.4 CAPSULE ORAL DAILY
Refills: 0 | Status: DISCONTINUED | OUTPATIENT
Start: 2024-04-11 | End: 2024-04-13

## 2024-04-11 RX ORDER — PANTOPRAZOLE SODIUM 20 MG/1
40 TABLET, DELAYED RELEASE ORAL
Refills: 0 | Status: DISCONTINUED | OUTPATIENT
Start: 2024-04-11 | End: 2024-04-13

## 2024-04-11 RX ORDER — TAMSULOSIN HYDROCHLORIDE 0.4 MG/1
0.4 CAPSULE ORAL AT BEDTIME
Refills: 0 | Status: DISCONTINUED | OUTPATIENT
Start: 2024-04-11 | End: 2024-04-11

## 2024-04-11 RX ORDER — DEXAMETHASONE 0.5 MG/5ML
4 ELIXIR ORAL EVERY 6 HOURS
Refills: 0 | Status: DISCONTINUED | OUTPATIENT
Start: 2024-04-11 | End: 2024-04-12

## 2024-04-11 RX ADMIN — OXYCODONE HYDROCHLORIDE 5 MILLIGRAM(S): 5 TABLET ORAL at 09:38

## 2024-04-11 RX ADMIN — OXYCODONE HYDROCHLORIDE 10 MILLIGRAM(S): 5 TABLET ORAL at 03:42

## 2024-04-11 RX ADMIN — Medication 5 MILLIGRAM(S): at 21:13

## 2024-04-11 RX ADMIN — OXYCODONE HYDROCHLORIDE 10 MILLIGRAM(S): 5 TABLET ORAL at 17:50

## 2024-04-11 RX ADMIN — OXYCODONE HYDROCHLORIDE 5 MILLIGRAM(S): 5 TABLET ORAL at 08:38

## 2024-04-11 RX ADMIN — HYDROMORPHONE HYDROCHLORIDE 1 MILLIGRAM(S): 2 INJECTION INTRAMUSCULAR; INTRAVENOUS; SUBCUTANEOUS at 00:47

## 2024-04-11 RX ADMIN — HYDROMORPHONE HYDROCHLORIDE 1 MILLIGRAM(S): 2 INJECTION INTRAMUSCULAR; INTRAVENOUS; SUBCUTANEOUS at 04:54

## 2024-04-11 RX ADMIN — HYDROMORPHONE HYDROCHLORIDE 1 MILLIGRAM(S): 2 INJECTION INTRAMUSCULAR; INTRAVENOUS; SUBCUTANEOUS at 10:07

## 2024-04-11 RX ADMIN — Medication 200 MICROGRAM(S): at 06:32

## 2024-04-11 RX ADMIN — OXYCODONE HYDROCHLORIDE 10 MILLIGRAM(S): 5 TABLET ORAL at 04:15

## 2024-04-11 RX ADMIN — HYDROMORPHONE HYDROCHLORIDE 1 MILLIGRAM(S): 2 INJECTION INTRAMUSCULAR; INTRAVENOUS; SUBCUTANEOUS at 20:22

## 2024-04-11 RX ADMIN — HYDROMORPHONE HYDROCHLORIDE 1 MILLIGRAM(S): 2 INJECTION INTRAMUSCULAR; INTRAVENOUS; SUBCUTANEOUS at 20:07

## 2024-04-11 RX ADMIN — HYDROMORPHONE HYDROCHLORIDE 1 MILLIGRAM(S): 2 INJECTION INTRAMUSCULAR; INTRAVENOUS; SUBCUTANEOUS at 15:16

## 2024-04-11 RX ADMIN — OXYCODONE HYDROCHLORIDE 10 MILLIGRAM(S): 5 TABLET ORAL at 12:18

## 2024-04-11 RX ADMIN — CYCLOBENZAPRINE HYDROCHLORIDE 5 MILLIGRAM(S): 10 TABLET, FILM COATED ORAL at 04:48

## 2024-04-11 RX ADMIN — Medication 4 MILLIGRAM(S): at 17:45

## 2024-04-11 RX ADMIN — HYDROMORPHONE HYDROCHLORIDE 1 MILLIGRAM(S): 2 INJECTION INTRAMUSCULAR; INTRAVENOUS; SUBCUTANEOUS at 15:21

## 2024-04-11 RX ADMIN — HYDROMORPHONE HYDROCHLORIDE 1 MILLIGRAM(S): 2 INJECTION INTRAMUSCULAR; INTRAVENOUS; SUBCUTANEOUS at 05:09

## 2024-04-11 RX ADMIN — HYDROMORPHONE HYDROCHLORIDE 1 MILLIGRAM(S): 2 INJECTION INTRAMUSCULAR; INTRAVENOUS; SUBCUTANEOUS at 10:22

## 2024-04-11 RX ADMIN — HYDROMORPHONE HYDROCHLORIDE 1 MILLIGRAM(S): 2 INJECTION INTRAMUSCULAR; INTRAVENOUS; SUBCUTANEOUS at 01:02

## 2024-04-11 RX ADMIN — TAMSULOSIN HYDROCHLORIDE 0.4 MILLIGRAM(S): 0.4 CAPSULE ORAL at 21:13

## 2024-04-11 RX ADMIN — OXYCODONE HYDROCHLORIDE 10 MILLIGRAM(S): 5 TABLET ORAL at 13:18

## 2024-04-11 RX ADMIN — LOSARTAN POTASSIUM 50 MILLIGRAM(S): 100 TABLET, FILM COATED ORAL at 08:38

## 2024-04-11 RX ADMIN — Medication 4 MILLIGRAM(S): at 11:22

## 2024-04-11 NOTE — PROGRESS NOTE ADULT - ASSESSMENT
69yo M with intractable low back pain with left lumbar radiculopathy 2/2 lumbar disc herniation     Plan:  - OR tomorrow for L2-3, L4-5 left microdiscectomy   - NPO after midnight  - Pre-op labs  - compression stockings for DVT ppx. Hold chemical DVT ppx  - Bladder scan q6 and straight cath as needed

## 2024-04-11 NOTE — PROGRESS NOTE ADULT - ASSESSMENT
68 M with HX of Hashimotos Thyroiditis, HTN, s/p cervical neck surgery, was admitted with acute intractable low back pain and left leg radiculopathy.    Acute Intractable Low Back Pain and Acute Left sided Radiculopathy with L4 Disk Herniation  -  appreciate NS consult  -  still with lower back pain with radiation to left leg and left foot, now complicated by urinary retention overnight  -  doppler of the left leg from overnight - no evidence of DVT  -  MRI with contrast reviewed:  Multilevel degenerative change of the lumbar spine, most significantly at the L4-L5 level where there is moderate to severe narrowing of the spinal canal and moderate to severe narrowing of the left neural foramen, Increased T2/STIR signal within the L5-S1 intervertebral disc with annular fissure  -  start IV Decadron  -  start Flomax  -  pain control  -  Flexeril PRN  -  inflammatory markers are negative  -  neurochecks q4 hours  -  will have surgery with Dr. Fuller tomorrow    HTN  -  continue Olmesartan    Hypothyroidism  -  continue Synthroid    Pre-Op Medical Evaluation  -  patient with good functional status, METS >4  -  RCRI:  0  -  EKG:  NSR, RBBB, LAD, LAHB, no acute ischemic changes  -  CXR:  no acute infiltrates or effusions  -  ECHO and Stress Test from office in 2023 placed in the chart - ECHO EF60-65%, no pulm HTN,  no AS, no MS, trace MR.  Exercise Stress test - negative  -  patient without any prior Hx of CHF, Arrthymias, Valvular Heart Disease, CAD, COPD/Asthma, WALTER, DVT/PE, DM, CKD, TIA/CVA  -  patient requires lumbar spine diskectomy, and at this time he is medically optimized and there are no medical contraindications to proceed with surgery tomorrow and no further cardiac testing is needed    DVT prophylaxis  -  venodynes   68 M with HX of Hashimotos Thyroiditis, HTN, s/p cervical neck surgery, was admitted with acute intractable low back pain and left leg radiculopathy.    Acute Intractable Low Back Pain and Acute Left sided Radiculopathy with L4 Disk Herniation  -  appreciate NS consult  -  still with lower back pain with radiation to left leg and left foot, now complicated by urinary retention overnight  -  doppler of the left leg from overnight - no evidence of DVT  -  MRI with contrast reviewed:  Multilevel degenerative change of the lumbar spine, most significantly at the L4-L5 level where there is moderate to severe narrowing of the spinal canal and moderate to severe narrowing of the left neural foramen, Increased T2/STIR signal within the L5-S1 intervertebral disc with annular fissure  -  start IV Decadron  -  monitor urine output closely, straight cath PRN  -  start Flomax  -  pain control  -  Flexeril PRN  -  inflammatory markers are negative  -  neurochecks q4 hours  -  will have surgery with Dr. Fuller tomorrow  -  Xray of the left hip reviewed:  negative    HTN  -  stable, continue Olmesartan    Hypothyroidism  -  continue Synthroid    Pre-Op Medical Evaluation  -  patient with good functional status, METS >4  -  RCRI:  0  -  EKG:  NSR, RBBB, LAD, LAHB, no acute ischemic changes  -  CXR:  no acute infiltrates or effusions  -  ECHO and Stress Test from office in 2023 placed in the chart - ECHO EF60-65%, no pulm HTN,  no AS, no MS, trace MR.  Exercise Stress test - negative (discussed with her cardiologist)  -  patient without any prior Hx of CHF, Arrthymias, Valvular Heart Disease, CAD, COPD/Asthma, WALTER, DVT/PE, DM, CKD, TIA/CVA  -  patient requires lumbar spine diskectomy, and at this time he is medically optimized and there are no medical contraindications to proceed with surgery tomorrow and no further cardiac testing is needed    DVT prophylaxis  -  venodynes, Lovenox discontinued in preparation for surgery tomorrow    d/w patient and TAD HENRY

## 2024-04-12 LAB
ANION GAP SERPL CALC-SCNC: 6 MMOL/L — SIGNIFICANT CHANGE UP (ref 5–17)
BLD GP AB SCN SERPL QL: SIGNIFICANT CHANGE UP
BUN SERPL-MCNC: 30 MG/DL — HIGH (ref 7–23)
CALCIUM SERPL-MCNC: 9.1 MG/DL — SIGNIFICANT CHANGE UP (ref 8.5–10.1)
CHLORIDE SERPL-SCNC: 102 MMOL/L — SIGNIFICANT CHANGE UP (ref 96–108)
CO2 SERPL-SCNC: 26 MMOL/L — SIGNIFICANT CHANGE UP (ref 22–31)
CREAT SERPL-MCNC: 0.91 MG/DL — SIGNIFICANT CHANGE UP (ref 0.5–1.3)
EGFR: 92 ML/MIN/1.73M2 — SIGNIFICANT CHANGE UP
GLUCOSE BLDC GLUCOMTR-MCNC: 161 MG/DL — HIGH (ref 70–99)
GLUCOSE SERPL-MCNC: 139 MG/DL — HIGH (ref 70–99)
HCT VFR BLD CALC: 40.5 % — SIGNIFICANT CHANGE UP (ref 39–50)
HGB BLD-MCNC: 13.9 G/DL — SIGNIFICANT CHANGE UP (ref 13–17)
MCHC RBC-ENTMCNC: 29.6 PG — SIGNIFICANT CHANGE UP (ref 27–34)
MCHC RBC-ENTMCNC: 34.3 GM/DL — SIGNIFICANT CHANGE UP (ref 32–36)
MCV RBC AUTO: 86.4 FL — SIGNIFICANT CHANGE UP (ref 80–100)
PLATELET # BLD AUTO: 213 K/UL — SIGNIFICANT CHANGE UP (ref 150–400)
POTASSIUM SERPL-MCNC: 4 MMOL/L — SIGNIFICANT CHANGE UP (ref 3.5–5.3)
POTASSIUM SERPL-SCNC: 4 MMOL/L — SIGNIFICANT CHANGE UP (ref 3.5–5.3)
RBC # BLD: 4.69 M/UL — SIGNIFICANT CHANGE UP (ref 4.2–5.8)
RBC # FLD: 12 % — SIGNIFICANT CHANGE UP (ref 10.3–14.5)
SODIUM SERPL-SCNC: 134 MMOL/L — LOW (ref 135–145)
WBC # BLD: 9.82 K/UL — SIGNIFICANT CHANGE UP (ref 3.8–10.5)
WBC # FLD AUTO: 9.82 K/UL — SIGNIFICANT CHANGE UP (ref 3.8–10.5)

## 2024-04-12 PROCEDURE — 63030 LAMOT DCMPRN NRV RT 1 LMBR: CPT | Mod: LT

## 2024-04-12 PROCEDURE — 99233 SBSQ HOSP IP/OBS HIGH 50: CPT

## 2024-04-12 PROCEDURE — 99221 1ST HOSP IP/OBS SF/LOW 40: CPT

## 2024-04-12 RX ORDER — HYDROMORPHONE HYDROCHLORIDE 2 MG/ML
0.5 INJECTION INTRAMUSCULAR; INTRAVENOUS; SUBCUTANEOUS
Refills: 0 | Status: DISCONTINUED | OUTPATIENT
Start: 2024-04-13 | End: 2024-04-13

## 2024-04-12 RX ORDER — ONDANSETRON 8 MG/1
4 TABLET, FILM COATED ORAL ONCE
Refills: 0 | Status: DISCONTINUED | OUTPATIENT
Start: 2024-04-13 | End: 2024-04-13

## 2024-04-12 RX ORDER — CEFAZOLIN SODIUM 1 G
2000 VIAL (EA) INJECTION EVERY 8 HOURS
Refills: 0 | Status: COMPLETED | OUTPATIENT
Start: 2024-04-12 | End: 2024-04-13

## 2024-04-12 RX ORDER — CEFAZOLIN SODIUM 1 G
2000 VIAL (EA) INJECTION EVERY 8 HOURS
Refills: 0 | Status: DISCONTINUED | OUTPATIENT
Start: 2024-04-12 | End: 2024-04-12

## 2024-04-12 RX ORDER — SODIUM CHLORIDE 9 MG/ML
1000 INJECTION, SOLUTION INTRAVENOUS
Refills: 0 | Status: DISCONTINUED | OUTPATIENT
Start: 2024-04-12 | End: 2024-04-13

## 2024-04-12 RX ADMIN — HYDROMORPHONE HYDROCHLORIDE 1 MILLIGRAM(S): 2 INJECTION INTRAMUSCULAR; INTRAVENOUS; SUBCUTANEOUS at 06:03

## 2024-04-12 RX ADMIN — HYDROMORPHONE HYDROCHLORIDE 1 MILLIGRAM(S): 2 INJECTION INTRAMUSCULAR; INTRAVENOUS; SUBCUTANEOUS at 05:48

## 2024-04-12 RX ADMIN — PANTOPRAZOLE SODIUM 40 MILLIGRAM(S): 20 TABLET, DELAYED RELEASE ORAL at 05:29

## 2024-04-12 RX ADMIN — Medication 5 MILLIGRAM(S): at 21:08

## 2024-04-12 RX ADMIN — CYCLOBENZAPRINE HYDROCHLORIDE 5 MILLIGRAM(S): 10 TABLET, FILM COATED ORAL at 09:37

## 2024-04-12 RX ADMIN — Medication 4 MILLIGRAM(S): at 13:09

## 2024-04-12 RX ADMIN — HYDROMORPHONE HYDROCHLORIDE 1 MILLIGRAM(S): 2 INJECTION INTRAMUSCULAR; INTRAVENOUS; SUBCUTANEOUS at 21:29

## 2024-04-12 RX ADMIN — HYDROMORPHONE HYDROCHLORIDE 1 MILLIGRAM(S): 2 INJECTION INTRAMUSCULAR; INTRAVENOUS; SUBCUTANEOUS at 21:14

## 2024-04-12 RX ADMIN — Medication 200 MICROGRAM(S): at 05:29

## 2024-04-12 RX ADMIN — Medication 4 MILLIGRAM(S): at 00:00

## 2024-04-12 RX ADMIN — SODIUM CHLORIDE 100 MILLILITER(S): 9 INJECTION, SOLUTION INTRAVENOUS at 21:09

## 2024-04-12 RX ADMIN — Medication 2000 MILLIGRAM(S): at 21:08

## 2024-04-12 RX ADMIN — SODIUM CHLORIDE 100 MILLILITER(S): 9 INJECTION, SOLUTION INTRAVENOUS at 09:38

## 2024-04-12 RX ADMIN — TAMSULOSIN HYDROCHLORIDE 0.4 MILLIGRAM(S): 0.4 CAPSULE ORAL at 09:36

## 2024-04-12 RX ADMIN — Medication 4 MILLIGRAM(S): at 05:28

## 2024-04-12 RX ADMIN — LOSARTAN POTASSIUM 50 MILLIGRAM(S): 100 TABLET, FILM COATED ORAL at 09:36

## 2024-04-12 NOTE — PROGRESS NOTE ADULT - ASSESSMENT
68 M with HX of Hashimotos Thyroiditis, HTN, s/p cervical neck surgery, was admitted with acute intractable low back pain and left leg radiculopathy.    Acute Intractable Low Back Pain and Acute Left sided Radiculopathy with L4 Disk Herniation  -  appreciate NS consult  -  clinically improved with regards to pain after starting on IV Decadron (day #2)  -  doppler of the left leg - no evidence of DVT  -  MRI with contrast reviewed:  Multilevel degenerative change of the lumbar spine, most significantly at the L4-L5 level where there is moderate to severe narrowing of the spinal canal and moderate to severe narrowing of the left neural foramen, Increased T2/STIR signal within the L5-S1 intervertebral disc with annular fissure  -  pain control  -  Flexeril PRN  -  inflammatory markers are negative  -  neurochecks q4 hours  -  Xray of the left hip reviewed:  negative  -  NPO except meds for microdiskectomy surgery with Dr. Fuller this afternoon  -  start IVFs    HTN  -  stable, continue Olmesartan    Hypothyroidism  -  continue Synthroid    Urinary Retention  -  likely has underlying BPH, he reports that he has had problems in the past with urinary flow and voiding but never saw urology  -  keep Oliver in for now  -  continue Flomax  -  will ask Urology to see    Pre-Op Medical Evaluation  -  patient with good functional status, METS >4  -  RCRI:  0  -  EKG:  NSR, RBBB, LAD, LAHB, no acute ischemic changes  -  CXR:  no acute infiltrates or effusions  -  ECHO and Stress Test from office in 2023 placed in the chart - ECHO EF60-65%, no pulm HTN,  no AS, no MS, trace MR.  Exercise Stress test - negative (discussed with her cardiologist)  -  patient without any prior Hx of CHF, Arrthymias, Valvular Heart Disease, CAD, COPD/Asthma, WALTER, DVT/PE, DM, CKD, TIA/CVA  -  patient requires lumbar spine diskectomy, and at this time he is medically optimized and there are no medical contraindications to proceed with surgery tomorrow and no further cardiac testing is needed    DVT prophylaxis  -  venodynes, Lovenox discontinued

## 2024-04-12 NOTE — CONSULT NOTE ADULT - ASSESSMENT
67yo M with intractable low back pain with left lumbar radiculopathy.   Patient has MRI report from [facility] on 4/8/24 reading moderate to severe central canal, mild right and moderate left neural foraminal stenosis at L4-5 with superimposed central and left-sided disc herniation resulting in mass effect upon the descending left L5 nerve root and narrowing of the left neural foramen     Plan:  - No acute neurosurgical interventions  - Admit to medicine for pain control   - Will obtain MRI imaging from    - Neurosurgery will continue to follow 
A/P:  69 y/o male with urinary retention     Leave bucio to bag drainage because of large residual post bucio placement.  Pt had 1 liter of urine come out after bucio placed. Would like to give the bladder some rest to allow the bladder to get back to its normal size.  Agree with Flomax 04. mg po qhs - Pt should go home with Rx for Flomax  Pt should go home with bucio to leg bag and follow up with Dr. Jung in 1-2 weeks [phone]  Above discussed with Dr. Mireles

## 2024-04-12 NOTE — CONSULT NOTE ADULT - SUBJECTIVE AND OBJECTIVE BOX
68 M presents to the ED with acute lower back pain with radiation to the left leg which started on Friday after he was doing some heavy lifting around the house.  He denies any fall or trauma.  Reports that the pain was severe and radiating to the left leg, foot and the left hip and groin.  He was seen by Grace Hayowod and had an outpatient MRI on 4/8 of the lower back which reportedly showed moderate-severe canal stenosis, moderate left foraminal stenosis at L4-L5 and left sided acute disk herniation with mass effect on the L5 nerve root.  He was scheduled for surgery with Dr. Edwards on Monday.  He was seen in the ED 1 day prior and was treated with Morphine and sent home with oxycodone.  He came back to the ED with intractable back pain and was admitted to medicine.  He was seen by Dr. Fuller and the NS PA and outpatient MRI findings were reviewed.  He is planned to get an MRI with contrast today and may have surgery with Dr. Fuller on this admission.  He denies any fevers, chills, shakes, nausea, vomiting.    Called to see pt because bucio placed for 1 liter last night.  Pt states at home he normally gets up 2x/night to void.  Also feels like he does not empty his bladder when he voids.  No other complaints    PAST MEDICAL HISTORY:  Hashimotos Thyroiditis with Hypothyroidism  HTN    PAST SURGICAL HISTORY:  s/p cervical neck surgery    FAMILY HISTORY:   Father -  alive, 93 years old  Mother - lung cancer    Allergies and Intolerances:        Allergies:  	No Known Allergies:     Home Medications:   * Patient Currently Takes Medications as of 10-Apr-2024 09:01 documented in Structured Notes  · 	oxyCODONE 5 mg oral tablet: Last Dose Taken:  , 1 tab(s) orally 4 times a day MDD: 4  · 	Synthroid 200 mcg (0.2 mg) oral tablet: Last Dose Taken:  , 1 tab(s) orally once a day  · 	olmesartan 20 mg oral tablet: Last Dose Taken:  , 1 tab(s) orally once a day    Patient History:    Social History:  · Substance use	No  · Social History (marital status, living situation, occupation, and sexual history)	no smoking, no drugs, drinks ETOH occasionally, retired, 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	No    Risk Assessment:    Present on Admission:  Deep Venous Thrombosis	no  Pulmonary Embolus	no  Urinary Catheter	no  Central Venous Catheter/PICC Line	no  Surgical Site Incision	no  Pressure Ulcer(s)	no     Review of Systems:  Review of Systems: All other systems reviewed and found to be negative with the exception of what has been described above.    Physical Exam:   Vital Signs Last 24 Hrs  T(C): 36.7 (12 Apr 2024 08:00), Max: 37.2 (11 Apr 2024 16:00)  T(F): 98 (12 Apr 2024 08:00), Max: 98.9 (11 Apr 2024 16:00)  HR: 77 (12 Apr 2024 08:00) (70 - 83)  BP: 114/69 (12 Apr 2024 08:00) (110/55 - 146/76)  BP(mean): 82 (11 Apr 2024 16:00) (82 - 82)  RR: 18 (12 Apr 2024 08:00) (18 - 18)  SpO2: 94% (12 Apr 2024 08:00) (94% - 98%)    Parameters below as of 12 Apr 2024 08:00  Patient On (Oxygen Delivery Method): room air      Head: NC/AT, no lesions noted  NECK:   supple, no carotid bruits  CV:  RRR, +S1, +S2, regular, no murmurs  Lungs:   CTA bilat, no wheezes, rales, rhonchi, good air entry bilaterally  Abdomen:   soft, NT/ND, +BS, no bladder palp  Back: No CVA tenderness  : Bucio in place - draining yellow urine  Lower Ext: no calf tenderness  SKIN:  no ulcers, lesions or rashes      LABS:                          13.9   9.82  )-----------( 213      ( 12 Apr 2024 08:37 )             40.5     04-12    134<L>  |  102  |  30<H>  ----------------------------<  139<H>  4.0   |  26  |  0.91    Ca    9.1      12 Apr 2024 08:37  Phos  4.5     04-10  Mg     2.4     04-10          Urinalysis Basic - ( 12 Apr 2024 08:37 )    Color: x / Appearance: x / SG: x / pH: x  Gluc: 139 mg/dL / Ketone: x  / Bili: x / Urobili: x   Blood: x / Protein: x / Nitrite: x   Leuk Esterase: x / RBC: x / WBC x   Sq Epi: x / Non Sq Epi: x / Bacteria: x      
HPI:  68 year old male PMH of HTN, BPH, autoimmune thyroiditis, hypothyroidism, cervical spine surgery 12/2023, who presented with complaints of left radicular leg pain. Patient was just in the ED last night for similar symptoms that was treated with Morphine and discharged with Percocet. 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 Pain progressed throughout the following days and became unbearable. He was seen my Dr. Hurst at [facility] who ordered and outpatient MRI at [facility] (4/8/24) and is now scheduled for a lumbar discectomy this coming Monday.  Patient reports having similar pain about 5 years ago that was relieved with two spinal injections, and was following with Dr. Hurst at that time as well. Patient reports being told that injections would not be of benefit this time and surgery would be the best option. Patient currently describes axial low back pain radiating posterolaterally down the left leg to the top of his foot. Not associated with paresthesias. Currently unable to ambulate because of the pain.  Denies right leg pain, weakness, bowel/bladder dysfunction, saddle anesthesia, unsteady gait.  	      PAST MEDICAL & SURGICAL HISTORY:  Cervical laminoplasty 12/2023 (Dr. Fletcher at [facility])       FAMILY HISTORY:     Noncontributory     Social Hx:  Nonsmoker, no drug or alcohol use    Allergies    No Known Allergies    Intolerances        MEDICATIONS  (STANDING):  cyclobenzaprine 10 milliGRAM(s) Oral once  sodium chloride 0.9%. 500 milliLiter(s) (125 mL/Hr) IV Continuous <Continuous>       ROS: Pertinent positives in HPI, all other ROS were reviewed and are negative.      Vital Signs Last 24 Hrs  T(C): 36.7 (10 Apr 2024 00:25), Max: 36.7 (09 Apr 2024 19:05)  T(F): 98 (10 Apr 2024 00:25), Max: 98 (09 Apr 2024 19:05)  HR: 71 (10 Apr 2024 00:25) (71 - 79)  BP: 150/83 (10 Apr 2024 00:25) (127/87 - 150/83)  BP(mean): 98 (10 Apr 2024 00:25) (98 - 98)  RR: 18 (10 Apr 2024 00:25) (18 - 18)  SpO2: 99% (10 Apr 2024 00:25) (99% - 100%)    Parameters below as of 10 Apr 2024 00:25  Patient On (Oxygen Delivery Method): room air        Physical Exam:  Constitutional: Awake / alert  HEENT: PERRL, EOMI  Neck: Supple  Respiratory: Respirations non-labored   Cardiovascular:  regular rhythm  Gastrointestinal: Soft, NT/ND  Extremities:  no edema  Musculoskeletal: no abnormal movements  Skin: No rashes    Neurological Exam:  HF: A&Ox 3, follows commands, normal affect, speech fluent  CN: PERRL, EOMI, no NLFD, tongue midline  Motor: UEs 5/5, RLE 5/5, LLE 5/5 except 4/5 EHL.   Sens: Intact to light touch  Reflexes: Symmetric in UEs and patella. Difficult to test achilles reflex due to pain and unable to relax, no clonus, no Johnson's   Coord:  No FNFA, JUNG intact   Gait/Balance: Cannot test      Labs:                        13.4   5.98  )-----------( 195      ( 09 Apr 2024 20:41 )             39.7     04-09    140  |  106  |  20  ----------------------------<  118<H>  4.4   |  32<H>  |  0.88    Ca    9.2      09 Apr 2024 20:41    TPro  7.3  /  Alb  3.7  /  TBili  0.6  /  DBili  x   /  AST  12<L>  /  ALT  11<L>  /  AlkPhos  90  04-09        PT/INR - ( 09 Apr 2024 20:41 )   PT: 10.5 sec;   INR: 0.93 ratio         PTT - ( 09 Apr 2024 20:41 )  PTT:28.0 sec

## 2024-04-13 ENCOUNTER — TRANSCRIPTION ENCOUNTER (OUTPATIENT)
Age: 69
End: 2024-04-13

## 2024-04-13 VITALS
DIASTOLIC BLOOD PRESSURE: 63 MMHG | HEART RATE: 76 BPM | RESPIRATION RATE: 18 BRPM | SYSTOLIC BLOOD PRESSURE: 111 MMHG | TEMPERATURE: 98 F | OXYGEN SATURATION: 99 %

## 2024-04-13 LAB
ANION GAP SERPL CALC-SCNC: 5 MMOL/L — SIGNIFICANT CHANGE UP (ref 5–17)
BASOPHILS # BLD AUTO: 0.01 K/UL — SIGNIFICANT CHANGE UP (ref 0–0.2)
BASOPHILS NFR BLD AUTO: 0.1 % — SIGNIFICANT CHANGE UP (ref 0–2)
BUN SERPL-MCNC: 26 MG/DL — HIGH (ref 7–23)
CALCIUM SERPL-MCNC: 8.8 MG/DL — SIGNIFICANT CHANGE UP (ref 8.5–10.1)
CHLORIDE SERPL-SCNC: 105 MMOL/L — SIGNIFICANT CHANGE UP (ref 96–108)
CO2 SERPL-SCNC: 28 MMOL/L — SIGNIFICANT CHANGE UP (ref 22–31)
CREAT SERPL-MCNC: 0.87 MG/DL — SIGNIFICANT CHANGE UP (ref 0.5–1.3)
EGFR: 95 ML/MIN/1.73M2 — SIGNIFICANT CHANGE UP
EOSINOPHIL # BLD AUTO: 0 K/UL — SIGNIFICANT CHANGE UP (ref 0–0.5)
EOSINOPHIL NFR BLD AUTO: 0 % — SIGNIFICANT CHANGE UP (ref 0–6)
GLUCOSE SERPL-MCNC: 112 MG/DL — HIGH (ref 70–99)
HCT VFR BLD CALC: 36 % — LOW (ref 39–50)
HGB BLD-MCNC: 12.1 G/DL — LOW (ref 13–17)
IMM GRANULOCYTES NFR BLD AUTO: 0.3 % — SIGNIFICANT CHANGE UP (ref 0–0.9)
LYMPHOCYTES # BLD AUTO: 1.19 K/UL — SIGNIFICANT CHANGE UP (ref 1–3.3)
LYMPHOCYTES # BLD AUTO: 9.4 % — LOW (ref 13–44)
MCHC RBC-ENTMCNC: 29.7 PG — SIGNIFICANT CHANGE UP (ref 27–34)
MCHC RBC-ENTMCNC: 33.6 GM/DL — SIGNIFICANT CHANGE UP (ref 32–36)
MCV RBC AUTO: 88.2 FL — SIGNIFICANT CHANGE UP (ref 80–100)
MONOCYTES # BLD AUTO: 0.82 K/UL — SIGNIFICANT CHANGE UP (ref 0–0.9)
MONOCYTES NFR BLD AUTO: 6.5 % — SIGNIFICANT CHANGE UP (ref 2–14)
NEUTROPHILS # BLD AUTO: 10.6 K/UL — HIGH (ref 1.8–7.4)
NEUTROPHILS NFR BLD AUTO: 83.7 % — HIGH (ref 43–77)
PLATELET # BLD AUTO: 192 K/UL — SIGNIFICANT CHANGE UP (ref 150–400)
POTASSIUM SERPL-MCNC: 4.2 MMOL/L — SIGNIFICANT CHANGE UP (ref 3.5–5.3)
POTASSIUM SERPL-SCNC: 4.2 MMOL/L — SIGNIFICANT CHANGE UP (ref 3.5–5.3)
RBC # BLD: 4.08 M/UL — LOW (ref 4.2–5.8)
RBC # FLD: 12.5 % — SIGNIFICANT CHANGE UP (ref 10.3–14.5)
SODIUM SERPL-SCNC: 138 MMOL/L — SIGNIFICANT CHANGE UP (ref 135–145)
WBC # BLD: 12.66 K/UL — HIGH (ref 3.8–10.5)
WBC # FLD AUTO: 12.66 K/UL — HIGH (ref 3.8–10.5)

## 2024-04-13 PROCEDURE — 99239 HOSP IP/OBS DSCHRG MGMT >30: CPT

## 2024-04-13 RX ORDER — TAMSULOSIN HYDROCHLORIDE 0.4 MG/1
1 CAPSULE ORAL
Qty: 30 | Refills: 2
Start: 2024-04-13

## 2024-04-13 RX ORDER — TAMSULOSIN HYDROCHLORIDE 0.4 MG/1
1 CAPSULE ORAL
Qty: 0 | Refills: 0 | DISCHARGE
Start: 2024-04-13

## 2024-04-13 RX ORDER — LOSARTAN POTASSIUM 100 MG/1
1 TABLET, FILM COATED ORAL
Qty: 30 | Refills: 2
Start: 2024-04-13

## 2024-04-13 RX ORDER — SODIUM CHLORIDE 9 MG/ML
1000 INJECTION, SOLUTION INTRAVENOUS
Refills: 0 | Status: DISCONTINUED | OUTPATIENT
Start: 2024-04-13 | End: 2024-04-13

## 2024-04-13 RX ORDER — OXYCODONE HYDROCHLORIDE 5 MG/1
1 TABLET ORAL
Qty: 0 | Refills: 0 | DISCHARGE
Start: 2024-04-13

## 2024-04-13 RX ORDER — LOSARTAN POTASSIUM 100 MG/1
1 TABLET, FILM COATED ORAL
Qty: 0 | Refills: 0 | DISCHARGE
Start: 2024-04-13

## 2024-04-13 RX ORDER — OLMESARTAN MEDOXOMIL 5 MG/1
1 TABLET, FILM COATED ORAL
Refills: 0 | DISCHARGE

## 2024-04-13 RX ADMIN — SODIUM CHLORIDE 125 MILLILITER(S): 9 INJECTION, SOLUTION INTRAVENOUS at 05:25

## 2024-04-13 RX ADMIN — PANTOPRAZOLE SODIUM 40 MILLIGRAM(S): 20 TABLET, DELAYED RELEASE ORAL at 05:23

## 2024-04-13 RX ADMIN — Medication 2000 MILLIGRAM(S): at 05:23

## 2024-04-13 RX ADMIN — LOSARTAN POTASSIUM 50 MILLIGRAM(S): 100 TABLET, FILM COATED ORAL at 09:33

## 2024-04-13 RX ADMIN — Medication 200 MICROGRAM(S): at 05:23

## 2024-04-13 NOTE — PHYSICAL THERAPY INITIAL EVALUATION ADULT - NSPTDMEREC_GEN_A_CORE
Pt will require a rolling walker at home due to the dignosis of difficulty walking to help complete their MRADL's./rolling walker

## 2024-04-13 NOTE — DISCHARGE NOTE PROVIDER - CARE PROVIDERS DIRECT ADDRESSES
[email or web address]

## 2024-04-13 NOTE — PROGRESS NOTE ADULT - REASON FOR ADMISSION
acute severe low back pain radiating to the left leg

## 2024-04-13 NOTE — PROGRESS NOTE ADULT - SUBJECTIVE AND OBJECTIVE BOX
Chart and meds reviewed.  Patient seen and examined.    4/11 - went into urinary retention overnight and needed to be straight cath for about 1L of urine, he is feeling better this morning and no lower abdominal pain, he reports pain in the left foot and left knee and in the back of the left thigh.  Doppler of the left leg was done last night and was negative.    All other systems reviewed and found to be negative with the exception of what has been described above.    MEDICATIONS  (STANDING):  levothyroxine 200 MICROGram(s) Oral daily  losartan 50 milliGRAM(s) Oral daily  melatonin 5 milliGRAM(s) Oral at bedtime    MEDICATIONS  (PRN):  acetaminophen     Tablet .. 650 milliGRAM(s) Oral every 6 hours PRN Temp greater or equal to 38C (100.4F)  cyclobenzaprine 5 milliGRAM(s) Oral three times a day PRN Muscle Spasm  HYDROmorphone  Injectable 1 milliGRAM(s) IV Push every 4 hours PRN Severe Pain (7 - 10)  ondansetron Injectable 4 milliGRAM(s) IV Push once PRN Nausea and/or Vomiting  oxyCODONE    IR 5 milliGRAM(s) Oral every 4 hours PRN Mild Pain (1 - 3)  oxyCODONE    IR 10 milliGRAM(s) Oral every 4 hours PRN Moderate Pain (4 - 6)    VITAL SIGNS:  T(F): 97.8 (04-11-24 @ 08:30), Max: 98.1 (04-11-24 @ 00:12)  HR: 72 (04-11-24 @ 08:30) (69 - 74)  BP: 115/61 (04-11-24 @ 08:41) (115/61 - 150/91)  RR: 18 (04-11-24 @ 08:30) (18 - 18)  SpO2: 98% (04-11-24 @ 08:30) (97% - 99%)    I&O's Summary    10 Apr 2024 07:01  -  11 Apr 2024 07:00  --------------------------------------------------------  IN: 0 mL / OUT: 1560 mL / NET: -1560 mL    PHYSICAL EXAM:  HEENT:  pupils equal and reactive, EOMI, no oropharyngeal lesions, erythema, exudates, oral thrush  NECK:   supple, no carotid bruits  CV:  +S1, +S2, regular, no murmurs  RESP:   lungs clear to auscultation bilaterally, no wheezing, rales, rhonchi, good air entry bilaterally  GI:  abdomen soft, non-tender, non-distended, normal BS  EXT:  no clubbing, no cyanosis, no edema, no calf pain, swelling or erythema  NEURO:  AAOX3, no focal neurological deficits, follows all commands, able to move extremities spontaneously  SKIN:  no ulcers, lesions or rashes    LABS:                        13.4   5.98  )-----------( 195      ( 09 Apr 2024 20:41 )             39.7     04-10    139  |  105  |  26<H>  ----------------------------<  102<H>  3.9   |  34<H>  |  1.04    Ca    8.9      10 Apr 2024 22:46  Phos  4.5     04-10  Mg     2.4     04-10    CRP and ESR are both negative
  Chart and meds reviewed.  Patient seen and examined.    4/12 - had persistent urinary retention yesterday and needed to have a Oliver catheter inserted.  Started on IV Decadron yesterday, reports now that pain this morning is better.  He is scheduled for a microdiskectomy this afternoon with Dr. Fuller.    All other systems reviewed and found to be negative with the exception of what has been described above.    MEDICATIONS  (STANDING):  dexAMETHasone  Injectable 4 milliGRAM(s) IV Push every 6 hours  levothyroxine 200 MICROGram(s) Oral daily  losartan 50 milliGRAM(s) Oral daily  melatonin 5 milliGRAM(s) Oral at bedtime  pantoprazole    Tablet 40 milliGRAM(s) Oral before breakfast  tamsulosin 0.4 milliGRAM(s) Oral daily    MEDICATIONS  (PRN):  acetaminophen     Tablet .. 650 milliGRAM(s) Oral every 6 hours PRN Temp greater or equal to 38C (100.4F)  cyclobenzaprine 5 milliGRAM(s) Oral three times a day PRN Muscle Spasm  HYDROmorphone  Injectable 1 milliGRAM(s) IV Push every 4 hours PRN Severe Pain (7 - 10)  ondansetron Injectable 4 milliGRAM(s) IV Push once PRN Nausea and/or Vomiting  oxyCODONE    IR 5 milliGRAM(s) Oral every 4 hours PRN Mild Pain (1 - 3)  oxyCODONE    IR 10 milliGRAM(s) Oral every 4 hours PRN Moderate Pain (4 - 6)    VITAL SIGNS:  T(F): 98 (04-12-24 @ 08:00), Max: 98.9 (04-11-24 @ 16:00)  HR: 77 (04-12-24 @ 08:00) (70 - 83)  BP: 114/69 (04-12-24 @ 08:00) (110/55 - 146/76)  RR: 18 (04-12-24 @ 08:00) (18 - 18)  SpO2: 94% (04-12-24 @ 08:00) (94% - 98%)    I&O's Summary    11 Apr 2024 07:01  -  12 Apr 2024 07:00  --------------------------------------------------------  IN: 2400 mL / OUT: 3900 mL / NET: -1500 mL    PHYSICAL EXAM:  HEENT:  pupils equal and reactive, EOMI, no oropharyngeal lesions, erythema, exudates, oral thrush  NECK:   supple, no carotid bruits  CV:  +S1, +S2, regular, no murmurs  RESP:   lungs clear to auscultation bilaterally, no wheezing, rales, rhonchi, good air entry bilaterally  GI:  abdomen soft, non-tender, non-distended, normal BS  EXT:  no clubbing, no cyanosis, no edema, no calf pain, swelling or erythema  :  + Oliver  NEURO:  AAOX3, no focal neurological deficits, follows all commands, able to move extremities spontaneously  SKIN:  no ulcers, lesions or rashes    LABS:  Labs pending this morning  
ANESTHESIA POST EVALUATION NOTE    MENTAL STATUS:    Patient Participation       Awake ______x____   Arousable __________   Sedated __________  Other:    AIRWAY PATENCY:     Satisfactory ___x_______   Other:    OXYGEN SATURATION (SpO2%):  __________   Room Air __________   Nasal Cannula (FIO2) __________   Face Mask (FIO2) __________   Intubated (FIO2) __________    T(F): 98.1 (04-13-24 @ 08:50), Max: 98.2 (04-13-24 @ 04:00)  HR: 79 (04-13-24 @ 08:50) (72 - 90)  BP: 126/60 (04-13-24 @ 08:50) (95/61 - 128/66)  RR: 16 (04-13-24 @ 08:50) (12 - 18)  SpO2: 95% (04-13-24 @ 08:50) (94% - 100%)  Wt(kg): --  I&O's Summary    12 Apr 2024 07:01  -  13 Apr 2024 07:00  --------------------------------------------------------  IN: 7500 mL / OUT: 2300 mL / NET: 5200 mL            HYDRATION STATUS:     Satisfactory __x________   Other:    NAUSEA/VOMITING:     None ____x______   Controlled ___________   Other:    PAIN:    Controlled with current regimen _____x___   Other:      COMMENTS:   
  HPI:  68 year old male PMH of HTN, BPH, autoimmune thyroiditis, hypothyroidism, cervical spine surgery 12/2023, who presented with complaints of left radicular leg pain. Patient was just in the ED the night proir for similar symptoms that was treated with Morphine and discharged with Percocet. 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 Pain progressed throughout the following days and became unbearable. He was seen my Dr. Hurst at [facility] who ordered and outpatient MRI at [facility] (4/8/24) and was scheduled for a lumbar discectomy this coming Monday.  Patient reports having similar pain about 5 years ago that was relieved with two spinal injections, and was following with Dr. Hurst at that time as well. Patient reports being told that injections would not be of benefit this time and surgery would be the best option. Patient currently describes axial low back pain radiating posterolaterally down the left leg to the top of his foot. Not associated with paresthesias. Currently unable to ambulate because of the pain.  Denies right leg pain, weakness, bowel/bladder dysfunction, saddle anesthesia, unsteady gait.    4/11- Patient seen and examined at bedside, reports some improvement in pain but still unable to ambulate Reports having urinary retention overnight and required straight cath with 900cc drainage. He has not felt the urge to urinate since. Patient states to have left sided back and hip pain radiating down the left leg. He reports pain in the hip joint.      PAST MEDICAL HISTORY:  Hashimotos Thyroiditis with Hypothyroidism  HTN    PAST SURGICAL HISTORY:  s/p cervical neck surgery    FAMILY HISTORY:   Father -  alive, 93 years old  Mother - lung cancer (10 Apr 2024 09:11)      acetaminophen     Tablet .. 650 milliGRAM(s) Oral every 6 hours PRN  cyclobenzaprine 5 milliGRAM(s) Oral three times a day PRN  dexAMETHasone  Injectable 4 milliGRAM(s) IV Push every 6 hours  HYDROmorphone  Injectable 1 milliGRAM(s) IV Push every 4 hours PRN  levothyroxine 200 MICROGram(s) Oral daily  losartan 50 milliGRAM(s) Oral daily  melatonin 5 milliGRAM(s) Oral at bedtime  ondansetron Injectable 4 milliGRAM(s) IV Push once PRN  oxyCODONE    IR 5 milliGRAM(s) Oral every 4 hours PRN  oxyCODONE    IR 10 milliGRAM(s) Oral every 4 hours PRN  pantoprazole    Tablet 40 milliGRAM(s) Oral before breakfast  tamsulosin 0.4 milliGRAM(s) Oral at bedtime        Physical Exam:  Constitutional: Awake / alert  HEENT: PERRL, EOMI  Neck: Supple  Respiratory: Respirations non-labored   Cardiovascular:  regular rhythm  Gastrointestinal: Soft, NT/ND  Extremities:  no edema  Musculoskeletal: no abnormal movements  Skin: No rashes    Neurological Exam:  HF: A&Ox 3, follows commands, normal affect, speech fluent  CN: PERRL, EOMI, no NLFD, tongue midline  Motor: UEs 5/5, RLE 5/5, LLE 5/5 except 4/5 EHL.   Sens: Intact to light touch  Reflexes: Symmetric in UEs and patella. Difficult to test achilles reflex due to pain and unable to relax, no clonus, no Johnson's   Coord:  No FNFA, JUNG intact   Gait/Balance: Cannot test      Vital Signs Last 24 Hrs  T(C): 36.6 (11 Apr 2024 08:30), Max: 36.7 (11 Apr 2024 00:12)  T(F): 97.8 (11 Apr 2024 08:30), Max: 98.1 (11 Apr 2024 00:12)  HR: 72 (11 Apr 2024 08:30) (69 - 74)  BP: 137/68 (11 Apr 2024 09:44) (115/61 - 150/91)  BP(mean): --  RR: 18 (11 Apr 2024 08:30) (18 - 18)  SpO2: 98% (11 Apr 2024 08:30) (97% - 99%)    Parameters below as of 11 Apr 2024 08:30  Patient On (Oxygen Delivery Method): room air                              13.4   5.98  )-----------( 195      ( 09 Apr 2024 20:41 )             39.7       04-10    139  |  105  |  26<H>  ----------------------------<  102<H>  3.9   |  34<H>  |  1.04    Ca    8.9      10 Apr 2024 22:46  Phos  4.5     04-10  Mg     2.4     04-10    TPro  7.3  /  Alb  3.7  /  TBili  0.6  /  DBili  x   /  AST  12<L>  /  ALT  11<L>  /  AlkPhos  90  04-09      PT/INR - ( 09 Apr 2024 20:41 )   PT: 10.5 sec;   INR: 0.93 ratio         PTT - ( 09 Apr 2024 20:41 )  PTT:28.0 sec    Radiology:  MR Lumbar Spine w/wo IV Cont (04.10.24 @ 09:35)  IMPRESSION:  1.  Multilevel degenerative change of the lumbar spine, most   significantly at the L4-L5 level where there is moderate to severe   narrowing of the spinal canal and moderate to severe narrowing of the   left neural foramen, which could explain patient's symptoms.  2.  Increased T2/STIR signal within the L5-S1 intervertebral disc with   annular fissure. In the absence of endplate changes/enhancement, finding   is likely degenerative in nature. Recommend clinical and laboratory   correlation.  
HPI:  68 year old male PMH of HTN, BPH, autoimmune thyroiditis, hypothyroidism, cervical spine surgery 12/2023, who presented with complaints of left radicular leg pain. Patient was just in the ED the night proir for similar symptoms that was treated with Morphine and discharged with Percocet. 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 Pain progressed throughout the following days and became unbearable. He was seen my Dr. Hurst at [facility] who ordered and outpatient MRI at [facility] (4/8/24) and was scheduled for a lumbar discectomy this coming Monday.  Patient reports having similar pain about 5 years ago that was relieved with two spinal injections, and was following with Dr. Hurst at that time as well. Patient reports being told that injections would not be of benefit this time and surgery would be the best option. Patient currently describes axial low back pain radiating posterolaterally down the left leg to the top of his foot. Not associated with paresthesias. Currently unable to ambulate because of the pain.  Denies right leg pain, weakness, bowel/bladder dysfunction, saddle anesthesia, unsteady gait.    4/11- Patient seen and examined at bedside, reports some improvement in pain but still unable to ambulate Reports having urinary retention overnight and required straight cath with 900cc drainage. He has not felt the urge to urinate since. Patient states to have left sided back and hip pain radiating down the left leg. He reports pain in the hip joint.    4/12- OR for lumbar microdiscectomy    4/13- POD#1 Patient seen and examined no acute events overnight. Patient reports pain in left leg, numbness in feet have resolved. + bucio for urinary retention.    Vital Signs Last 24 Hrs  T(C): 36.7 (13 Apr 2024 08:50), Max: 36.8 (13 Apr 2024 04:00)  T(F): 98.1 (13 Apr 2024 08:50), Max: 98.2 (13 Apr 2024 04:00)  HR: 79 (13 Apr 2024 08:50) (72 - 90)  BP: 126/60 (13 Apr 2024 08:50) (95/61 - 128/66)  BP(mean): 77 (12 Apr 2024 20:01) (72 - 77)  RR: 16 (13 Apr 2024 08:50) (12 - 18)  SpO2: 95% (13 Apr 2024 08:50) (94% - 100%)    Parameters below as of 13 Apr 2024 08:50  Patient On (Oxygen Delivery Method): room air        MEDICATIONS  (STANDING):  lactated ringers. 1000 milliLiter(s) (100 mL/Hr) IV Continuous <Continuous>  lactated ringers. 1000 milliLiter(s) (125 mL/Hr) IV Continuous <Continuous>  levothyroxine 200 MICROGram(s) Oral daily  losartan 50 milliGRAM(s) Oral daily  melatonin 5 milliGRAM(s) Oral at bedtime  pantoprazole    Tablet 40 milliGRAM(s) Oral before breakfast  tamsulosin 0.4 milliGRAM(s) Oral daily    MEDICATIONS  (PRN):  acetaminophen     Tablet .. 650 milliGRAM(s) Oral every 6 hours PRN Temp greater or equal to 38C (100.4F)  cyclobenzaprine 5 milliGRAM(s) Oral three times a day PRN Muscle Spasm  HYDROmorphone  Injectable 1 milliGRAM(s) IV Push every 4 hours PRN Severe Pain (7 - 10)  HYDROmorphone  Injectable 0.5 milliGRAM(s) IV Push every 10 minutes PRN Moderate Pain (4 - 6)  ondansetron Injectable 4 milliGRAM(s) IV Push once PRN Nausea and/or Vomiting  ondansetron Injectable 4 milliGRAM(s) IV Push once PRN Nausea and/or Vomiting  oxyCODONE    IR 5 milliGRAM(s) Oral every 4 hours PRN Mild Pain (1 - 3)  oxyCODONE    IR 10 milliGRAM(s) Oral every 4 hours PRN Moderate Pain (4 - 6)      PHYSICAL EXAM:  Constitutional: Awake / alert  HEENT: PERRL, EOMI  Neck: Supple  Respiratory: Respirations non-labored   Cardiovascular:  regular rhythm  Gastrointestinal: Soft, NT/ND  Extremities:  no edema  Musculoskeletal: no abnormal movements  Skin: dressing c/d/i    Neurological Exam:  HF: A&Ox 3, follows commands, normal affect, speech fluent  CN: PERRL, EOMI, tongue midline  Motor: Strength 5/5 throughout  Sens: Intact to light touch  Reflexes: Symmetric in UEs and patella  Gait/Balance: Cannot test        LABS:                         12.1   12.66 )-----------( 192      ( 13 Apr 2024 07:11 )             36.0     04-13    138  |  105  |  26<H>  ----------------------------<  112<H>  4.2   |  28  |  0.87    Ca    8.8      13 Apr 2024 07:11

## 2024-04-13 NOTE — DISCHARGE NOTE PROVIDER - NSDCMRMEDTOKEN_GEN_ALL_CORE_FT
losartan 50 mg oral tablet: 1 tab(s) orally once a day  losartan 50 mg oral tablet: 1 tab(s) orally once a day  oxyCODONE 5 mg oral tablet: 1 tab(s) orally every 4 hours As needed Mild Pain (1 - 3)  Synthroid 200 mcg (0.2 mg) oral tablet: 1 tab(s) orally once a day  tamsulosin 0.4 mg oral capsule: 1 cap(s) orally once a day  tamsulosin 0.4 mg oral capsule: 1 cap(s) orally once a day (at bedtime)

## 2024-04-13 NOTE — PROGRESS NOTE ADULT - ASSESSMENT
67yo M with intractable low back pain with left lumbar radiculopathy 2/2 lumbar disc herniation. Now s/p L4-5 left microdiscectomy     POD#1     Plan:  - Advance diet/ activity as tolerated  - Pain control  - OOB/PT  - Keep bucio outpatient f/u urology upon discharge for TOV  - Follow up with Dr. Fuller in 2 weeks. Instructed to call sooner if any red flags such as swelling, drainage from wound, fevers >101.3F, sudden weakness.   - Patient advised no heavy lifting >10lbs, no bending/twisting of the back. Shower starting tomorrow 4/14. NO tub baths no swimming until cleared by surgeon.     D/w Dr. Fuller

## 2024-04-13 NOTE — DISCHARGE NOTE NURSING/CASE MANAGEMENT/SOCIAL WORK - PATIENT PORTAL LINK FT
You can access the FollowMyHealth Patient Portal offered by [facility] by registering at the following website: [email or web address]. By joining [facility]’s FollowMyHealth portal, you will also be able to view your health information using other applications (apps) compatible with our system.

## 2024-04-13 NOTE — DISCHARGE NOTE PROVIDER - CARE PROVIDER_API CALL
Linden Fuller [facility]  Neurosurgery  [address], [address]  [place] [address]  Phone: [phone]  Fax: [phone]  Follow Up Time: 2 weeks    Cecilio Jung  Urology  [address], [address]  [place] [address]  Phone: [phone]  Fax: [phone]  Follow Up Time: 1 week    Parag Dahl  Internal Medicine  [address] [place] [address], [address] [place] [id number]  Phone: [phone]  Fax: [phone]  Follow Up Time: 1 month

## 2024-04-13 NOTE — DISCHARGE NOTE PROVIDER - NSDCCPCAREPLAN_GEN_ALL_CORE_FT
PRINCIPAL DISCHARGE DIAGNOSIS  Diagnosis: Acute low back pain with left-sided sciatica  Assessment and Plan of Treatment: you had an L4 microdiskectomy with Dr. Fuller on 4/12, follow up with Dr. Fuller in 2 weeks, no heavy lifting, may shower tomorrow anmd remove bandage tomorrow, pain medications as needed with Oxycodone      SECONDARY DISCHARGE DIAGNOSES  Diagnosis: Hypertension  Assessment and Plan of Treatment: you were taking Olmesartan at home, please stop this medication and you will stay on Losartan instead, follow up with Dr. Dahl in the office to have your blood pressure checked    Diagnosis: Urinary retention  Assessment and Plan of Treatment: you had urinary retention with about 1L of urine in the bladder at least twice, suspect you have BPH, continue the Flomax and you will be discharged home with a bucio catheter as recommended by urology and follow up with Dr. Cecilio Jung in the office on Tuesday next week

## 2024-04-13 NOTE — DISCHARGE NOTE NURSING/CASE MANAGEMENT/SOCIAL WORK - NSDCPETBCESMAN_GEN_ALL_CORE
(4) no limitation
If you are a smoker, it is important for your health to stop smoking. Please be aware that second hand smoke is also harmful.

## 2024-04-13 NOTE — DISCHARGE NOTE PROVIDER - PROVIDER TOKENS
PROVIDER:[TOKEN:[[id number]],FOLLOWUP:[2 weeks]],PROVIDER:[TOKEN:[[id number]],FOLLOWUP:[1 week]],PROVIDER:[TOKEN:[[id number]],FOLLOWUP:[1 month]] Private car

## 2024-04-13 NOTE — DISCHARGE NOTE PROVIDER - HOSPITAL COURSE
68 M with HX of Hashimotos Thyroiditis, HTN, s/p cervical neck surgery, was admitted with acute intractable low back pain and acute left leg radiculopathy.    Acute Intractable Low Back Pain and Acute Left sided Radiculopathy with L4 Disk Herniation  -  s/p microdiskectomy with Dr. Fuller on 4/12  -  doing well post-op with minimal pain  -  follow up with Dr. Fuller in 2 weeks  -  Oxycodone as needed for pain    HTN  -  stable, patient has requested to switch to Losartan, script sent to his pharmacy    Hypothyroidism  -  continue Synthroid    Urinary Retention  -  likely has underlying BPH, he reports that he has had problems in the past with urinary flow and voiding but never saw urology  -  seen by urology, will keep Oliver in place for d/c home and patient will follow up with Dr. Cecilio Jung next Tuesday in the office  -  continue Flomax    Dispo:  medically stable for d/c home today with a Oliver catheter, follow up with Dr. Jung next Tuesday, with Dr. Fuller in 2 weeks and with Dr. Dahl to follow up the BP.    d/w NS PA and RN

## 2024-04-13 NOTE — DISCHARGE NOTE PROVIDER - NSDCACTIVITY_GEN_ALL_CORE
Showering allowed/Driving allowed/No heavy lifting/straining/Follow Instructions Provided by your Surgical Team

## 2024-04-23 ENCOUNTER — APPOINTMENT (OUTPATIENT)
Dept: UROLOGY | Facility: CLINIC | Age: 69
End: 2024-04-23
Payer: MEDICARE

## 2024-04-23 VITALS — DIASTOLIC BLOOD PRESSURE: 68 MMHG | SYSTOLIC BLOOD PRESSURE: 130 MMHG

## 2024-04-23 DIAGNOSIS — R33.8 OTHER POSTPROCEDURAL COMPLICATIONS AND DISORDERS OF GENITOURINARY SYSTEM: ICD-10-CM

## 2024-04-23 DIAGNOSIS — N99.89 OTHER POSTPROCEDURAL COMPLICATIONS AND DISORDERS OF GENITOURINARY SYSTEM: ICD-10-CM

## 2024-04-23 PROCEDURE — 99203 OFFICE O/P NEW LOW 30 MIN: CPT

## 2024-04-23 NOTE — ASSESSMENT
[FreeTextEntry1] : 70 yo M with urinary retention. Will perform TOV. For BPH, continue tamsulosin. RTO in 2-4 weeks for PVR or sooner PRN.

## 2024-04-23 NOTE — HISTORY OF PRESENT ILLNESS
[FreeTextEntry1] : 69 year old man seen 04/23/2024 with complaint of urinry retention. He was recently admitted to the hospital for radiculopathy, s/p lumbar microdiscectomy. Found to have >1000 mL in bladder, bucio was placed. Pt reports weak stream, straining to void, frequency at his baseline prior to this. He was started on tamsulosin 0.4 mg about 2 weeks ago. Tolerating bucio.

## 2024-04-23 NOTE — PHYSICAL EXAM
[Normal Appearance] : normal appearance [Well Groomed] : well groomed [General Appearance - In No Acute Distress] : no acute distress [Edema] : no peripheral edema [Respiration, Rhythm And Depth] : normal respiratory rhythm and effort [Exaggerated Use Of Accessory Muscles For Inspiration] : no accessory muscle use [Abdomen Soft] : soft [Abdomen Tenderness] : non-tender [Costovertebral Angle Tenderness] : no ~M costovertebral angle tenderness [Urinary Bladder Findings] : the bladder was normal on palpation [Normal Station and Gait] : the gait and station were normal for the patient's age [] : no rash [No Focal Deficits] : no focal deficits [Oriented To Time, Place, And Person] : oriented to person, place, and time [Affect] : the affect was normal [Mood] : the mood was normal [No Palpable Adenopathy] : no palpable adenopathy [de-identified] : Oliver drianing clear yellow urine

## 2024-04-26 ENCOUNTER — APPOINTMENT (OUTPATIENT)
Dept: NEUROSURGERY | Facility: CLINIC | Age: 69
End: 2024-04-26
Payer: MEDICARE

## 2024-04-26 VITALS
HEIGHT: 72 IN | OXYGEN SATURATION: 98 % | HEART RATE: 84 BPM | DIASTOLIC BLOOD PRESSURE: 67 MMHG | SYSTOLIC BLOOD PRESSURE: 130 MMHG | BODY MASS INDEX: 29.8 KG/M2 | WEIGHT: 220 LBS

## 2024-04-26 PROCEDURE — 99024 POSTOP FOLLOW-UP VISIT: CPT

## 2024-05-03 NOTE — CDI QUERY NOTE - NSCDIOTHERTXTBX_GEN_ALL_CORE_HH
The patient is documented in the  ECG with Bifascicular block, and  Medicine notes documented  EKG:  NSR, RBBB, LAD, LAHB, no acute ischemic changes     ECG (04.10.24 @ 01:03) >Diagnosis Line Normal sinus rhythm Right bundle branch bloc kLeft anterior fascicular block*** Bifascicular block ***    Could you please clarify the abbreviation for  LAHB since coding cannot code from ECG directly    -RBBB with left anterior fascicular block  -Right bundle branch block with left anterior fascicular block  -Bifascicular block  -Other The patient is documented in the  ECG with Bifascicular block, and  Medicine notes documented  EKG:  NSR, RBBB, LAD, LAHB, no acute ischemic changes     ECG (04.10.24 @ 01:03) >Diagnosis Line Normal sinus rhythm Right bundle branch block Left anterior fascicular block*** Bifascicular block ***    Could you please clarify the abbreviation for  LAHB since coding cannot code from ECG directly and LAHB is not a [facility] approved abbreviation     -RBBB with left anterior fascicular block  -Right bundle branch block with left anterior fascicular block  -Bifascicular block  -Other

## 2024-05-08 NOTE — CHART NOTE - NSCHARTNOTEFT_GEN_A_CORE
Clarification of EKG diagnosis:   Right bundle branch block with left anterior fascicular block
Upon chart review, patient with stable weight, does not currently meet criteria for Protein Calorie Malnutrition risk per [facility]ST. RD remains available for assessment per protocol or as needed.
Advised by nurse that patient was complaining of left calf pain. Patient reports that pain is to his left calf and into his foot. Patient also noted urinary retention. States that he has a problem with his L3 disc which he will have surgery on this week.    Plan  Left calf pain   - US venous duplex  - BMP, mag, Phos     Urinary retention  - Bladder scan   - Straight cath

## 2024-05-28 ENCOUNTER — APPOINTMENT (OUTPATIENT)
Dept: NEUROSURGERY | Facility: CLINIC | Age: 69
End: 2024-05-28
Payer: MEDICARE

## 2024-05-28 ENCOUNTER — APPOINTMENT (OUTPATIENT)
Dept: UROLOGY | Facility: CLINIC | Age: 69
End: 2024-05-28
Payer: MEDICARE

## 2024-05-28 ENCOUNTER — NON-APPOINTMENT (OUTPATIENT)
Age: 69
End: 2024-05-28

## 2024-05-28 VITALS
OXYGEN SATURATION: 98 % | DIASTOLIC BLOOD PRESSURE: 82 MMHG | SYSTOLIC BLOOD PRESSURE: 152 MMHG | HEART RATE: 70 BPM | WEIGHT: 215 LBS | HEIGHT: 72 IN | BODY MASS INDEX: 29.12 KG/M2

## 2024-05-28 DIAGNOSIS — N13.8 BENIGN PROSTATIC HYPERPLASIA WITH LOWER URINARY TRACT SYMPMS: ICD-10-CM

## 2024-05-28 DIAGNOSIS — N40.1 BENIGN PROSTATIC HYPERPLASIA WITH LOWER URINARY TRACT SYMPMS: ICD-10-CM

## 2024-05-28 DIAGNOSIS — Z98.890 OTHER SPECIFIED POSTPROCEDURAL STATES: ICD-10-CM

## 2024-05-28 DIAGNOSIS — Z12.5 ENCOUNTER FOR SCREENING FOR MALIGNANT NEOPLASM OF PROSTATE: ICD-10-CM

## 2024-05-28 PROCEDURE — 99024 POSTOP FOLLOW-UP VISIT: CPT

## 2024-05-28 PROCEDURE — 99213 OFFICE O/P EST LOW 20 MIN: CPT

## 2024-05-28 RX ORDER — TAMSULOSIN HYDROCHLORIDE 0.4 MG/1
0.4 CAPSULE ORAL
Qty: 90 | Refills: 3 | Status: ACTIVE | COMMUNITY
Start: 2024-05-28 | End: 1900-01-01

## 2024-05-28 NOTE — ASSESSMENT
[FreeTextEntry1] : 69-year-old male with BPH. Now with improved PVR.  Will continue tamsulosin 0.4mg.   For PSA screening, VERO Benign. Will obtain PSA.   Will RTO in 6 months or sooner if needed.

## 2024-05-28 NOTE — CONSULT LETTER
[Dear  ___] : Dear  [unfilled], [Courtesy Letter:] : I had the pleasure of seeing your patient, [unfilled], in my office today. [Sincerely,] : Sincerely, [FreeTextEntry2] : Jacoby Dahl, [place] [address]  [FreeTextEntry1] : This is a 69-year-old pleasant male who presents along with his wife for his first postop visit after an L4 microdiscectomy was performed by Dr. Fuller 2 weeks ago.  The patient had presented to the ED at [facility] on April 10, 2024.  He presented with an acute severe lower back pain that radiated to the left leg.  After lumbar spine imaging, AL L for disc herniation was noted with cord compression.  The patient was taken to the OR and a microdiscectomy was performed.  There were no complications.  The patient was discharged on oxycodone.  Today the patient presents for his first postop visit and reporting no left leg pain and no back pain.  However he does experience left foot numbness and toe numbness of the left foot which is slowly resolving.  The patient no longer takes any oxycodone or pain medications.  He denies any incisional wound drainage or pain.  The urinary retention postoperatively has resolved.  The patient denies any leg weakness.  He does report imbalance due to the foot numbness.  He denies any falls or trips.  Together we reviewed the MRI that was performed on April 10, 2024 that was taken during his hospitalization.  I pointed out the significant herniated disc at L4 compressing on the nerve roots.  I used the patient's own MRI and spine models to describe this finding..  The patient had a good understanding.  On neurologic examination, the patient is alert and oriented.  Appears well and in no distress.  Speech clear and fluent.   Full strength in all muscle groups.  Gait is unsteady.   Negative straight leg testing bilaterally.  The lumbar incision is clean and dry.  Glue dissolving.  Site unremarkable which is flat and no drainage noted.   The patient is now two weeks following an L4 microdiscectomy.  The left radicular pain is resolved with residual foot and toe numbness.  The patient was advised to avoid bending and lifting over ten pounds for another month.  We discussed body mechanics and to avoid these maneuvers.  Wound care was provided for the incision site.  The wound should be kept clean and dry.  The patient will wash the site with soap and water gently and pat dry.  No abrasive motion when washing.  If any signs of infection including redness, swelling, pain, fever, or drainage they will contact the office.  He may take Advil or Tylenol if needed.    I explained the toe and foot numbness  should resolve slowly over time.  If this worsens he may contact our office.  Gabapentin was   discussed but his numbness is improving and we will hold off for now.  In one month the patient will return to see myself and Dr. Fuller in follow up.  If he has any concerns, he will contact the office in the interim.    Thank you for very kindly including me in the evaluation and treatment of your patient.  Please do not hesitate to contact me should you have any concerns or questions regarding the patients recent L4 microdiscectomy and follow-up treatment and evaluation plan.    [FreeTextEntry3] : Kimberly Lombardo, DNP, NP Nurse Practitioner Neurosurgery and Spine [facility] [employer] [facility]  [employer] [facility] [employer] email: [email or web address]  [address] P- [phone] F- [phone]

## 2024-05-28 NOTE — REASON FOR VISIT
[Spouse] : spouse [de-identified] : 4/12/2024  [de-identified] : 2 weeks [de-identified] : L4 microdiscectomy

## 2024-05-28 NOTE — CONSULT LETTER
[Dear  ___] : Dear  [unfilled], [Courtesy Letter:] : I had the pleasure of seeing your patient, [unfilled], in my office today. [Sincerely,] : Sincerely, [FreeTextEntry2] : Jacoby Dahl MD [address] [place], [place] [id number]   [FreeTextEntry1] : This is a 69-year-old pleasant male who presents today for a routine postop visit after undergoing an L4 microdiscectomy performed on 4/12/2024, 6 weeks ago.   To recap, the patient had presented to the ED at [facility] on April 10, 2024.  He presented with an acute severe lower back pain that radiated to the left leg.  After lumbar spine imaging, a L4 disc herniation was noted with cord compression.  The patient was taken to the OR and a microdiscectomy was performed.  There were no complications.  The patient was discharged home.    Today the patient reports he has no pain.  The initial leg radicular pain is completely resolved.  There is residual left leg numbness but he is able to carry out all desired activities.  The patient is expressing balance disturbance.  He has not fallen or tripped.  He believes he has left leg weakness.  No bowel or bladder dysfunction.  He is under the care of Urology for urinary retention postop and this is well controlled with Flomax.   Overall, the patient reports his numbness has improved over the last four weeks.  He does not require any pain medications.  He denies any incisional wound drainage or pain.  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  At present he is in vestibular rehab with some improvement.  He has significant essential tremors of the UE that are being treated by an interventionalist.  A cervical laminoplasty was performed in an outside facility in 11/2023 and the patient has severe limitations of his ROM of the neck especially when looking upward.   Currently the patient works on a boat full time.   There are no  images to review at this visit.   On neurologic examination, the patient is alert and oriented.  Appears well and in no distress.  Speech clear and fluent.   Full strength in all muscle groups.  Gait is unsteady and sways at times.   Negative straight leg testing bilaterally.  The lumbar incision is clean and dry, healing well.  The patient has limited ROM left to right and he is unable to hyperextend his neck.  There is clearly stiffness of his neck and slight kyphosis.  Left hand essential  tremor.      The patient is now six weeks following an L4 microdiscectomy.  The left radicular pain is resolved with residual left leg and foot numbness.  The patient was advised to slowly increase his bending and lifting.  We discussed that the patient should follow up with his cervical spine surgeon for increased balance issues,   I explained the left sided toe and foot numbness should resolve slowly over time.  If this worsens, he may contact our office.   The patient was provided a script for PT to focus on his gait imbalance and lumbar spine.  In addition, increased neck motion was recommended in PT.  The patient will follow up on a PRN basis.  If he desires to lift heavy objects he will return to our office in the fall and consider a back brace.  If he has any concerns, he will contact the office in the interim.    Thank you for very kindly including me in the evaluation and treatment of your patient.  Please do not hesitate to contact me should you have any concerns or questions regarding the patients recent L4 microdiscectomy and follow-up treatment and evaluation plan.    [FreeTextEntry3] : Kimberly Lombardo, DNP, NP Nurse Practitioner Neurosurgery  [facility] [place] [address] Assistant  [employer] [facility] [employer] email: [email or web address] P- [phone] F- [phone]

## 2024-05-28 NOTE — PHYSICAL EXAM
[Normal Appearance] : normal appearance [Well Groomed] : well groomed [General Appearance - In No Acute Distress] : no acute distress [Edema] : no peripheral edema [Respiration, Rhythm And Depth] : normal respiratory rhythm and effort [Exaggerated Use Of Accessory Muscles For Inspiration] : no accessory muscle use [Abdomen Soft] : soft [Abdomen Tenderness] : non-tender [Costovertebral Angle Tenderness] : no ~M costovertebral angle tenderness [Urinary Bladder Findings] : the bladder was normal on palpation [Normal Station and Gait] : the gait and station were normal for the patient's age [] : no rash [No Focal Deficits] : no focal deficits [Oriented To Time, Place, And Person] : oriented to person, place, and time [Affect] : the affect was normal [Mood] : the mood was normal [No Palpable Adenopathy] : no palpable adenopathy [Urethral Meatus] : meatus normal [Testes Tenderness] : no tenderness of the testes [Testes Mass (___cm)] : there were no testicular masses [Prostate Tenderness] : the prostate was not tender [No Prostate Nodules] : no prostate nodules [Prostate Size ___ gm] : prostate size [unfilled] gm [de-identified] : Oliver drianing clear yellow urine

## 2024-05-28 NOTE — HISTORY OF PRESENT ILLNESS
[FreeTextEntry1] : 69-year-old man seen 04/23/2024 with complaint of urinary retention. He was recently admitted to the hospital for radiculopathy, s/p lumbar microdiscectomy. Found to have >1000 mL in bladder, bucio was placed. Pt reports weak stream, straining to void, frequency at his baseline prior to this. He was started on tamsulosin 0.4 mg about 2 weeks ago. Tolerating bucio.  05/28/24: Patient presents for follow up. States he is voiding well on tamsulosin 0.4mg. Denies bothersome LUTS. PVR 5 ml.  No hematuria, no dysuria, no frequency, no urgency, no hesitancy, no straining, and no incontinence.    No fevers, no chills, no nausea, no vomiting, no flank pain.     No family history contributory to prostate cancer.

## 2024-05-28 NOTE — REVIEW OF SYSTEMS
[Leg Weakness] : leg weakness [Numbness] : numbness [Abnormal Sensation] : an abnormal sensation [Difficulty Walking] : difficulty walking [Ataxia] : ataxia [Negative] : Heme/Lymph [Tingling] : no tingling [Frequent Falls] : not falling [de-identified] : no pain, left leg numbness and weakness balance disturbance

## 2024-05-28 NOTE — REVIEW OF SYSTEMS
[Numbness] : numbness [Tingling] : tingling [Inability to Walk] : inability to walk [Negative] : Heme/Lymph [Hand Weakness] : no hand weakness [Leg Weakness] : no leg weakness [Difficulty Walking] : no difficulty walking [de-identified] : numb left foot and gait imabalance

## 2024-05-29 LAB — PSA SERPL-MCNC: 3.82 NG/ML

## 2025-05-02 ENCOUNTER — APPOINTMENT (OUTPATIENT)
Dept: INTERNAL MEDICINE | Facility: CLINIC | Age: 70
End: 2025-05-02

## 2025-06-02 ENCOUNTER — APPOINTMENT (OUTPATIENT)
Dept: INTERNAL MEDICINE | Facility: CLINIC | Age: 70
End: 2025-06-02
Payer: MEDICARE

## 2025-06-02 ENCOUNTER — NON-APPOINTMENT (OUTPATIENT)
Age: 70
End: 2025-06-02

## 2025-06-02 VITALS
WEIGHT: 214 LBS | SYSTOLIC BLOOD PRESSURE: 140 MMHG | BODY MASS INDEX: 28.99 KG/M2 | OXYGEN SATURATION: 98 % | RESPIRATION RATE: 16 BRPM | TEMPERATURE: 98 F | HEART RATE: 75 BPM | HEIGHT: 72 IN | DIASTOLIC BLOOD PRESSURE: 80 MMHG

## 2025-06-02 DIAGNOSIS — Z78.9 OTHER SPECIFIED HEALTH STATUS: ICD-10-CM

## 2025-06-02 DIAGNOSIS — E06.3 AUTOIMMUNE THYROIDITIS: ICD-10-CM

## 2025-06-02 DIAGNOSIS — Z00.00 ENCOUNTER FOR GENERAL ADULT MEDICAL EXAMINATION W/OUT ABNORMAL FINDINGS: ICD-10-CM

## 2025-06-02 DIAGNOSIS — Z12.5 ENCOUNTER FOR SCREENING FOR MALIGNANT NEOPLASM OF PROSTATE: ICD-10-CM

## 2025-06-02 DIAGNOSIS — Z82.49 FAMILY HISTORY OF ISCHEMIC HEART DISEASE AND OTHER DISEASES OF THE CIRCULATORY SYSTEM: ICD-10-CM

## 2025-06-02 DIAGNOSIS — Z80.1 FAMILY HISTORY OF MALIGNANT NEOPLASM OF TRACHEA, BRONCHUS AND LUNG: ICD-10-CM

## 2025-06-02 DIAGNOSIS — N13.8 BENIGN PROSTATIC HYPERPLASIA WITH LOWER URINARY TRACT SYMPMS: ICD-10-CM

## 2025-06-02 DIAGNOSIS — N40.1 BENIGN PROSTATIC HYPERPLASIA WITH LOWER URINARY TRACT SYMPMS: ICD-10-CM

## 2025-06-02 PROCEDURE — 99205 OFFICE O/P NEW HI 60 MIN: CPT

## 2025-06-02 PROCEDURE — 93000 ELECTROCARDIOGRAM COMPLETE: CPT

## 2025-06-02 RX ORDER — FLUTICASONE PROPIONATE 50 UG/1
50 SPRAY, METERED NASAL
Qty: 1 | Refills: 0 | Status: ACTIVE | COMMUNITY
Start: 2025-06-02 | End: 1900-01-01

## 2025-06-02 RX ORDER — TAMSULOSIN HYDROCHLORIDE 0.4 MG/1
0.4 CAPSULE ORAL
Qty: 90 | Refills: 1 | Status: ACTIVE | COMMUNITY
Start: 2025-06-02 | End: 1900-01-01

## 2025-06-02 RX ORDER — LOSARTAN POTASSIUM AND HYDROCHLOROTHIAZIDE 12.5; 5 MG/1; MG/1
50-12.5 TABLET ORAL
Qty: 90 | Refills: 1 | Status: ACTIVE | COMMUNITY
Start: 2025-06-02